# Patient Record
Sex: MALE | Race: BLACK OR AFRICAN AMERICAN | Employment: FULL TIME | URBAN - METROPOLITAN AREA
[De-identification: names, ages, dates, MRNs, and addresses within clinical notes are randomized per-mention and may not be internally consistent; named-entity substitution may affect disease eponyms.]

---

## 2017-12-14 ENCOUNTER — HOSPITAL ENCOUNTER (OUTPATIENT)
Dept: LAB | Age: 42
Discharge: HOME | End: 2017-12-14
Payer: OTHER GOVERNMENT

## 2017-12-14 DIAGNOSIS — I10 BENIGN HYPERTENSION: ICD-10-CM

## 2017-12-14 LAB
ALBUMIN SERPL-MCNC: 3.1 G/DL (ref 3.4–5)
ALBUMIN/GLOB SERPL: 0.8 {RATIO} (ref 0.8–1.7)
ALP SERPL-CCNC: 79 U/L (ref 45–117)
ALT SERPL-CCNC: 22 U/L (ref 16–61)
ANION GAP SERPL CALC-SCNC: 8 MMOL/L (ref 3–18)
AST SERPL-CCNC: 15 U/L (ref 15–37)
BILIRUB SERPL-MCNC: 0.3 MG/DL (ref 0.2–1)
BUN SERPL-MCNC: 22 MG/DL (ref 7–18)
BUN/CREAT SERPL: 16 (ref 12–20)
CALCIUM SERPL-MCNC: 8.8 MG/DL (ref 8.5–10.1)
CHLORIDE SERPL-SCNC: 104 MMOL/L (ref 100–108)
CO2 SERPL-SCNC: 27 MMOL/L (ref 21–32)
CREAT SERPL-MCNC: 1.34 MG/DL (ref 0.6–1.3)
GLOBULIN SER CALC-MCNC: 4.1 G/DL (ref 2–4)
GLUCOSE SERPL-MCNC: 106 MG/DL (ref 74–99)
POTASSIUM SERPL-SCNC: 4.4 MMOL/L (ref 3.5–5.5)
PROT SERPL-MCNC: 7.2 G/DL (ref 6.4–8.2)
SODIUM SERPL-SCNC: 139 MMOL/L (ref 136–145)

## 2017-12-14 PROCEDURE — 36415 COLL VENOUS BLD VENIPUNCTURE: CPT | Performed by: ORTHOPAEDIC SURGERY

## 2017-12-14 PROCEDURE — 93005 ELECTROCARDIOGRAM TRACING: CPT

## 2017-12-14 PROCEDURE — 80053 COMPREHEN METABOLIC PANEL: CPT | Performed by: ORTHOPAEDIC SURGERY

## 2017-12-14 RX ORDER — LISINOPRIL 2.5 MG/1
TABLET ORAL DAILY
Status: ON HOLD | COMMUNITY
End: 2017-12-20

## 2017-12-14 RX ORDER — FUROSEMIDE 40 MG/1
TABLET ORAL DAILY
Status: ON HOLD | COMMUNITY
End: 2017-12-29

## 2017-12-14 RX ORDER — CARVEDILOL 25 MG/1
25 TABLET ORAL 2 TIMES DAILY WITH MEALS
Status: ON HOLD | COMMUNITY
End: 2017-12-29

## 2017-12-15 LAB
ATRIAL RATE: 96 BPM
CALCULATED P AXIS, ECG09: 27 DEGREES
CALCULATED R AXIS, ECG10: -5 DEGREES
CALCULATED T AXIS, ECG11: -115 DEGREES
DIAGNOSIS, 93000: NORMAL
P-R INTERVAL, ECG05: 166 MS
Q-T INTERVAL, ECG07: 410 MS
QRS DURATION, ECG06: 100 MS
QTC CALCULATION (BEZET), ECG08: 517 MS
VENTRICULAR RATE, ECG03: 96 BPM

## 2017-12-19 ENCOUNTER — ANESTHESIA EVENT (OUTPATIENT)
Dept: SURGERY | Age: 42
DRG: 492 | End: 2017-12-19
Payer: OTHER GOVERNMENT

## 2017-12-20 ENCOUNTER — APPOINTMENT (OUTPATIENT)
Dept: GENERAL RADIOLOGY | Age: 42
DRG: 492 | End: 2017-12-20
Attending: PHYSICIAN ASSISTANT
Payer: OTHER GOVERNMENT

## 2017-12-20 ENCOUNTER — ANESTHESIA (OUTPATIENT)
Dept: SURGERY | Age: 42
DRG: 492 | End: 2017-12-20
Payer: OTHER GOVERNMENT

## 2017-12-20 ENCOUNTER — HOSPITAL ENCOUNTER (INPATIENT)
Age: 42
LOS: 9 days | Discharge: HOME | DRG: 492 | End: 2017-12-29
Attending: ORTHOPAEDIC SURGERY | Admitting: ORTHOPAEDIC SURGERY
Payer: OTHER GOVERNMENT

## 2017-12-20 ENCOUNTER — APPOINTMENT (OUTPATIENT)
Dept: GENERAL RADIOLOGY | Age: 42
DRG: 492 | End: 2017-12-20
Attending: ORTHOPAEDIC SURGERY
Payer: OTHER GOVERNMENT

## 2017-12-20 PROBLEM — M25.372 ANKLE INSTABILITY, LEFT: Status: ACTIVE | Noted: 2017-12-20

## 2017-12-20 PROBLEM — I97.89 BRADYCARDIA FOLLOWING SURGERY: Status: ACTIVE | Noted: 2017-12-20

## 2017-12-20 LAB
ABO + RH BLD: NORMAL
AMPHET UR QL SCN: NEGATIVE
ANION GAP SERPL CALC-SCNC: 4 MMOL/L (ref 3–18)
ARTERIAL PATENCY WRIST A: YES
ARTERIAL PATENCY WRIST A: YES
ATRIAL RATE: 98 BPM
BARBITURATES UR QL SCN: NEGATIVE
BASE DEFICIT BLD-SCNC: 1 MMOL/L
BASE EXCESS BLD CALC-SCNC: 0 MMOL/L
BASOPHILS # BLD: 0.1 K/UL (ref 0–0.06)
BASOPHILS NFR BLD: 1 % (ref 0–2)
BDY SITE: ABNORMAL
BDY SITE: ABNORMAL
BENZODIAZ UR QL: POSITIVE
BLOOD GROUP ANTIBODIES SERPL: NORMAL
BUN SERPL-MCNC: 20 MG/DL (ref 7–18)
BUN/CREAT SERPL: 13 (ref 12–20)
CA-I SERPL-SCNC: 1.15 MMOL/L (ref 1.12–1.32)
CALCIUM SERPL-MCNC: 8 MG/DL (ref 8.5–10.1)
CALCULATED P AXIS, ECG09: 27 DEGREES
CALCULATED R AXIS, ECG10: 24 DEGREES
CALCULATED T AXIS, ECG11: -40 DEGREES
CANNABINOIDS UR QL SCN: NEGATIVE
CHLORIDE SERPL-SCNC: 105 MMOL/L (ref 100–108)
CK MB CFR SERPL CALC: 0.7 % (ref 0–4)
CK MB SERPL-MCNC: 1.7 NG/ML (ref 5–25)
CK SERPL-CCNC: 239 U/L (ref 39–308)
CO2 SERPL-SCNC: 29 MMOL/L (ref 21–32)
COCAINE UR QL SCN: NEGATIVE
CREAT SERPL-MCNC: 1.53 MG/DL (ref 0.6–1.3)
DIAGNOSIS, 93000: NORMAL
DIFFERENTIAL METHOD BLD: ABNORMAL
EOSINOPHIL # BLD: 0.2 K/UL (ref 0–0.4)
EOSINOPHIL NFR BLD: 2 % (ref 0–5)
ERYTHROCYTE [DISTWIDTH] IN BLOOD BY AUTOMATED COUNT: 14.4 % (ref 11.6–14.5)
GAS FLOW.O2 O2 DELIVERY SYS: ABNORMAL L/MIN
GAS FLOW.O2 O2 DELIVERY SYS: ABNORMAL L/MIN
GAS FLOW.O2 SETTING OXYMISER: 12 BPM
GAS FLOW.O2 SETTING OXYMISER: 16 BPM
GLUCOSE BLD STRIP.AUTO-MCNC: 94 MG/DL (ref 70–110)
GLUCOSE SERPL-MCNC: 209 MG/DL (ref 74–99)
HCO3 BLD-SCNC: 26.5 MMOL/L (ref 22–26)
HCO3 BLD-SCNC: 26.8 MMOL/L (ref 22–26)
HCT VFR BLD AUTO: 44.9 % (ref 36–48)
HDSCOM,HDSCOM: ABNORMAL
HGB BLD-MCNC: 14.2 G/DL (ref 13–16)
INSPIRATION.DURATION SETTING TIME VENT: 0.9 SEC
INSPIRATION.DURATION SETTING TIME VENT: 0.9 SEC
LYMPHOCYTES # BLD: 2.1 K/UL (ref 0.9–3.6)
LYMPHOCYTES NFR BLD: 23 % (ref 21–52)
MAGNESIUM SERPL-MCNC: 2 MG/DL (ref 1.6–2.6)
MCH RBC QN AUTO: 27.5 PG (ref 24–34)
MCHC RBC AUTO-ENTMCNC: 31.6 G/DL (ref 31–37)
MCV RBC AUTO: 87 FL (ref 74–97)
METHADONE UR QL: NEGATIVE
MONOCYTES # BLD: 0.8 K/UL (ref 0.05–1.2)
MONOCYTES NFR BLD: 9 % (ref 3–10)
NEUTS SEG # BLD: 6 K/UL (ref 1.8–8)
NEUTS SEG NFR BLD: 65 % (ref 40–73)
O2/TOTAL GAS SETTING VFR VENT: 5 %
O2/TOTAL GAS SETTING VFR VENT: 50 %
OPIATES UR QL: NEGATIVE
P-R INTERVAL, ECG05: 160 MS
PCO2 BLD: 53.2 MMHG (ref 35–45)
PCO2 BLD: 64.9 MMHG (ref 35–45)
PCP UR QL: NEGATIVE
PEEP RESPIRATORY: 5 CMH2O
PEEP RESPIRATORY: 5 CMH2O
PH BLD: 7.22 [PH] (ref 7.35–7.45)
PH BLD: 7.31 [PH] (ref 7.35–7.45)
PLATELET # BLD AUTO: 276 K/UL (ref 135–420)
PMV BLD AUTO: 11 FL (ref 9.2–11.8)
PO2 BLD: 110 MMHG (ref 80–100)
PO2 BLD: 114 MMHG (ref 80–100)
POTASSIUM SERPL-SCNC: 5.6 MMOL/L (ref 3.5–5.5)
Q-T INTERVAL, ECG07: 432 MS
QRS DURATION, ECG06: 100 MS
QTC CALCULATION (BEZET), ECG08: 551 MS
RBC # BLD AUTO: 5.16 M/UL (ref 4.7–5.5)
SAO2 % BLD: 97 % (ref 92–97)
SAO2 % BLD: 98 % (ref 92–97)
SERVICE CMNT-IMP: ABNORMAL
SERVICE CMNT-IMP: ABNORMAL
SODIUM SERPL-SCNC: 138 MMOL/L (ref 136–145)
SPECIMEN EXP DATE BLD: NORMAL
SPECIMEN TYPE: ABNORMAL
SPECIMEN TYPE: ABNORMAL
TOTAL RESP. RATE, ITRR: 16
TOTAL RESP. RATE, ITRR: 18
TROPONIN I SERPL-MCNC: 0.07 NG/ML (ref 0–0.04)
VENTILATION MODE VENT: ABNORMAL
VENTILATION MODE VENT: ABNORMAL
VENTRICULAR RATE, ECG03: 98 BPM
VOLUME CONTROL PLUS IVLCP: YES
VOLUME CONTROL PLUS IVLCP: YES
VT SETTING VENT: 500 ML
VT SETTING VENT: 500 ML
WBC # BLD AUTO: 9.1 K/UL (ref 4.6–13.2)

## 2017-12-20 PROCEDURE — 74011250636 HC RX REV CODE- 250/636: Performed by: ANESTHESIOLOGY

## 2017-12-20 PROCEDURE — 77030003601 HC NDL NRV BLK BBMI -A: Performed by: ORTHOPAEDIC SURGERY

## 2017-12-20 PROCEDURE — 74011000258 HC RX REV CODE- 258

## 2017-12-20 PROCEDURE — 77030019896 HC KT ARTERIAL LN TELE -B

## 2017-12-20 PROCEDURE — 76942 ECHO GUIDE FOR BIOPSY: CPT | Performed by: ANESTHESIOLOGY

## 2017-12-20 PROCEDURE — 77030018836 HC SOL IRR NACL ICUM -A: Performed by: ORTHOPAEDIC SURGERY

## 2017-12-20 PROCEDURE — 76060000034 HC ANESTHESIA 1.5 TO 2 HR: Performed by: ORTHOPAEDIC SURGERY

## 2017-12-20 PROCEDURE — 65610000006 HC RM INTENSIVE CARE

## 2017-12-20 PROCEDURE — 77030005538 HC CATH URETH FOL44 BARD -B

## 2017-12-20 PROCEDURE — 36600 WITHDRAWAL OF ARTERIAL BLOOD: CPT

## 2017-12-20 PROCEDURE — 64450 NJX AA&/STRD OTHER PN/BRANCH: CPT | Performed by: ANESTHESIOLOGY

## 2017-12-20 PROCEDURE — 82550 ASSAY OF CK (CPK): CPT

## 2017-12-20 PROCEDURE — 86900 BLOOD TYPING SEROLOGIC ABO: CPT | Performed by: NURSE PRACTITIONER

## 2017-12-20 PROCEDURE — 74011000250 HC RX REV CODE- 250

## 2017-12-20 PROCEDURE — 77030013797 HC KT TRNSDUC PRSSR EDWD -A

## 2017-12-20 PROCEDURE — 77030002916 HC SUT ETHLN J&J -A: Performed by: ORTHOPAEDIC SURGERY

## 2017-12-20 PROCEDURE — 94002 VENT MGMT INPAT INIT DAY: CPT

## 2017-12-20 PROCEDURE — 74011250637 HC RX REV CODE- 250/637: Performed by: ANESTHESIOLOGY

## 2017-12-20 PROCEDURE — 82330 ASSAY OF CALCIUM: CPT

## 2017-12-20 PROCEDURE — 76010000153 HC OR TIME 1.5 TO 2 HR: Performed by: ORTHOPAEDIC SURGERY

## 2017-12-20 PROCEDURE — 02HV33Z INSERTION OF INFUSION DEVICE INTO SUPERIOR VENA CAVA, PERCUTANEOUS APPROACH: ICD-10-PCS | Performed by: INTERNAL MEDICINE

## 2017-12-20 PROCEDURE — 80307 DRUG TEST PRSMV CHEM ANLYZR: CPT

## 2017-12-20 PROCEDURE — 82803 BLOOD GASES ANY COMBINATION: CPT

## 2017-12-20 PROCEDURE — 77030031139 HC SUT VCRL2 J&J -A: Performed by: ORTHOPAEDIC SURGERY

## 2017-12-20 PROCEDURE — 77030008467 HC STPLR SKN COVD -B: Performed by: ORTHOPAEDIC SURGERY

## 2017-12-20 PROCEDURE — 74011250636 HC RX REV CODE- 250/636

## 2017-12-20 PROCEDURE — 74011250636 HC RX REV CODE- 250/636: Performed by: NURSE PRACTITIONER

## 2017-12-20 PROCEDURE — 74011250636 HC RX REV CODE- 250/636: Performed by: PHYSICIAN ASSISTANT

## 2017-12-20 PROCEDURE — 77030020782 HC GWN BAIR PAWS FLX 3M -B: Performed by: ORTHOPAEDIC SURGERY

## 2017-12-20 PROCEDURE — 0SJG0ZZ INSPECTION OF LEFT ANKLE JOINT, OPEN APPROACH: ICD-10-PCS | Performed by: ORTHOPAEDIC SURGERY

## 2017-12-20 PROCEDURE — 74011250636 HC RX REV CODE- 250/636: Performed by: INTERNAL MEDICINE

## 2017-12-20 PROCEDURE — 74011000258 HC RX REV CODE- 258: Performed by: PHYSICIAN ASSISTANT

## 2017-12-20 PROCEDURE — 5A12012 PERFORMANCE OF CARDIAC OUTPUT, SINGLE, MANUAL: ICD-10-PCS | Performed by: ANESTHESIOLOGY

## 2017-12-20 PROCEDURE — 82962 GLUCOSE BLOOD TEST: CPT

## 2017-12-20 PROCEDURE — 36592 COLLECT BLOOD FROM PICC: CPT

## 2017-12-20 PROCEDURE — 77030034850

## 2017-12-20 PROCEDURE — 85025 COMPLETE CBC W/AUTO DIFF WBC: CPT

## 2017-12-20 PROCEDURE — 74011250637 HC RX REV CODE- 250/637: Performed by: PHYSICIAN ASSISTANT

## 2017-12-20 PROCEDURE — 77030019952 HC CANSTR VAC ASST KCON -B

## 2017-12-20 PROCEDURE — 74011000250 HC RX REV CODE- 250: Performed by: PHYSICIAN ASSISTANT

## 2017-12-20 PROCEDURE — 80048 BASIC METABOLIC PNL TOTAL CA: CPT

## 2017-12-20 PROCEDURE — 77030003029 HC SUT VCRL J&J -B: Performed by: ORTHOPAEDIC SURGERY

## 2017-12-20 PROCEDURE — 83735 ASSAY OF MAGNESIUM: CPT

## 2017-12-20 PROCEDURE — 77030032490 HC SLV COMPR SCD KNE COVD -B: Performed by: ORTHOPAEDIC SURGERY

## 2017-12-20 PROCEDURE — 93005 ELECTROCARDIOGRAM TRACING: CPT

## 2017-12-20 PROCEDURE — 3E0T3BZ INTRODUCTION OF ANESTHETIC AGENT INTO PERIPHERAL NERVES AND PLEXI, PERCUTANEOUS APPROACH: ICD-10-PCS | Performed by: ANESTHESIOLOGY

## 2017-12-20 PROCEDURE — 71010 XR CHEST PORT: CPT

## 2017-12-20 PROCEDURE — C8929 TTE W OR WO FOL WCON,DOPPLER: HCPCS

## 2017-12-20 PROCEDURE — 77030019605: Performed by: ORTHOPAEDIC SURGERY

## 2017-12-20 RX ORDER — IPRATROPIUM BROMIDE AND ALBUTEROL SULFATE 2.5; .5 MG/3ML; MG/3ML
3 SOLUTION RESPIRATORY (INHALATION)
Status: DISCONTINUED | OUTPATIENT
Start: 2017-12-20 | End: 2017-12-29 | Stop reason: HOSPADM

## 2017-12-20 RX ORDER — FAMOTIDINE 10 MG/ML
20 INJECTION INTRAVENOUS EVERY 12 HOURS
Status: DISCONTINUED | OUTPATIENT
Start: 2017-12-20 | End: 2017-12-24

## 2017-12-20 RX ORDER — SODIUM CHLORIDE 0.9 % (FLUSH) 0.9 %
5-10 SYRINGE (ML) INJECTION AS NEEDED
Status: DISCONTINUED | OUTPATIENT
Start: 2017-12-20 | End: 2017-12-20 | Stop reason: HOSPADM

## 2017-12-20 RX ORDER — MIDAZOLAM IN 0.9 % SOD.CHLORID 1 MG/ML
0-10 PLASTIC BAG, INJECTION (ML) INTRAVENOUS
Status: DISCONTINUED | OUTPATIENT
Start: 2017-12-20 | End: 2017-12-23

## 2017-12-20 RX ORDER — ATROPINE SULFATE 0.4 MG/ML
INJECTION, SOLUTION ENDOTRACHEAL; INTRAMEDULLARY; INTRAMUSCULAR; INTRAVENOUS; SUBCUTANEOUS AS NEEDED
Status: DISCONTINUED | OUTPATIENT
Start: 2017-12-20 | End: 2017-12-20 | Stop reason: HOSPADM

## 2017-12-20 RX ORDER — DOBUTAMINE HYDROCHLORIDE 200 MG/100ML
2.5 INJECTION INTRAVENOUS CONTINUOUS
Status: DISCONTINUED | OUTPATIENT
Start: 2017-12-20 | End: 2017-12-22

## 2017-12-20 RX ORDER — SODIUM CHLORIDE, SODIUM LACTATE, POTASSIUM CHLORIDE, CALCIUM CHLORIDE 600; 310; 30; 20 MG/100ML; MG/100ML; MG/100ML; MG/100ML
75 INJECTION, SOLUTION INTRAVENOUS CONTINUOUS
Status: DISCONTINUED | OUTPATIENT
Start: 2017-12-20 | End: 2017-12-20

## 2017-12-20 RX ORDER — ROPIVACAINE HYDROCHLORIDE 5 MG/ML
30 INJECTION, SOLUTION EPIDURAL; INFILTRATION; PERINEURAL
Status: COMPLETED | OUTPATIENT
Start: 2017-12-20 | End: 2017-12-20

## 2017-12-20 RX ORDER — MIDAZOLAM HYDROCHLORIDE 1 MG/ML
INJECTION, SOLUTION INTRAMUSCULAR; INTRAVENOUS
Status: COMPLETED
Start: 2017-12-20 | End: 2017-12-20

## 2017-12-20 RX ORDER — FAMOTIDINE 20 MG/50ML
20 INJECTION, SOLUTION INTRAVENOUS EVERY 12 HOURS
Status: DISCONTINUED | OUTPATIENT
Start: 2017-12-20 | End: 2017-12-20 | Stop reason: RX

## 2017-12-20 RX ORDER — SODIUM CHLORIDE 9 MG/ML
100 INJECTION, SOLUTION INTRAVENOUS CONTINUOUS
Status: DISCONTINUED | OUTPATIENT
Start: 2017-12-20 | End: 2017-12-20

## 2017-12-20 RX ORDER — NOREPINEPHRINE BITARTRATE 1 MG/ML
INJECTION, SOLUTION INTRAVENOUS
Status: DISPENSED
Start: 2017-12-20 | End: 2017-12-21

## 2017-12-20 RX ORDER — PROPOFOL 10 MG/ML
INJECTION, EMULSION INTRAVENOUS
Status: COMPLETED
Start: 2017-12-20 | End: 2017-12-20

## 2017-12-20 RX ORDER — EPINEPHRINE 0.1 MG/ML
INJECTION INTRACARDIAC; INTRAVENOUS
Status: COMPLETED | OUTPATIENT
Start: 2017-12-20 | End: 2017-12-20

## 2017-12-20 RX ORDER — ROCURONIUM BROMIDE 10 MG/ML
INJECTION, SOLUTION INTRAVENOUS AS NEEDED
Status: DISCONTINUED | OUTPATIENT
Start: 2017-12-20 | End: 2017-12-20 | Stop reason: HOSPADM

## 2017-12-20 RX ORDER — CHLORHEXIDINE GLUCONATE 1.2 MG/ML
10 RINSE ORAL EVERY 12 HOURS
Status: DISCONTINUED | OUTPATIENT
Start: 2017-12-20 | End: 2017-12-23

## 2017-12-20 RX ORDER — IPRATROPIUM BROMIDE AND ALBUTEROL SULFATE 2.5; .5 MG/3ML; MG/3ML
3 SOLUTION RESPIRATORY (INHALATION)
Status: DISCONTINUED | OUTPATIENT
Start: 2017-12-20 | End: 2017-12-20

## 2017-12-20 RX ORDER — EPINEPHRINE 1 MG/ML
INJECTION, SOLUTION, CONCENTRATE INTRAVENOUS AS NEEDED
Status: DISCONTINUED | OUTPATIENT
Start: 2017-12-20 | End: 2017-12-20 | Stop reason: HOSPADM

## 2017-12-20 RX ORDER — MIDAZOLAM HYDROCHLORIDE 1 MG/ML
2 INJECTION, SOLUTION INTRAMUSCULAR; INTRAVENOUS ONCE
Status: COMPLETED | OUTPATIENT
Start: 2017-12-20 | End: 2017-12-20

## 2017-12-20 RX ORDER — FAMOTIDINE 20 MG/1
20 TABLET, FILM COATED ORAL ONCE
Status: COMPLETED | OUTPATIENT
Start: 2017-12-20 | End: 2017-12-20

## 2017-12-20 RX ORDER — GLYCOPYRROLATE 0.2 MG/ML
INJECTION INTRAMUSCULAR; INTRAVENOUS AS NEEDED
Status: DISCONTINUED | OUTPATIENT
Start: 2017-12-20 | End: 2017-12-20 | Stop reason: HOSPADM

## 2017-12-20 RX ORDER — PROPOFOL 10 MG/ML
0-50 VIAL (ML) INTRAVENOUS
Status: DISCONTINUED | OUTPATIENT
Start: 2017-12-20 | End: 2017-12-23

## 2017-12-20 RX ORDER — FENTANYL CITRATE 50 UG/ML
100 INJECTION, SOLUTION INTRAMUSCULAR; INTRAVENOUS ONCE
Status: COMPLETED | OUTPATIENT
Start: 2017-12-20 | End: 2017-12-20

## 2017-12-20 RX ORDER — CARVEDILOL 12.5 MG/1
12.5 TABLET ORAL 2 TIMES DAILY WITH MEALS
COMMUNITY
End: 2017-12-29

## 2017-12-20 RX ORDER — LIDOCAINE HYDROCHLORIDE 20 MG/ML
INJECTION, SOLUTION EPIDURAL; INFILTRATION; INTRACAUDAL; PERINEURAL AS NEEDED
Status: DISCONTINUED | OUTPATIENT
Start: 2017-12-20 | End: 2017-12-20 | Stop reason: HOSPADM

## 2017-12-20 RX ORDER — PROPOFOL 10 MG/ML
INJECTION, EMULSION INTRAVENOUS AS NEEDED
Status: DISCONTINUED | OUTPATIENT
Start: 2017-12-20 | End: 2017-12-20 | Stop reason: HOSPADM

## 2017-12-20 RX ORDER — SODIUM CHLORIDE 0.9 % (FLUSH) 0.9 %
5-10 SYRINGE (ML) INJECTION EVERY 8 HOURS
Status: DISCONTINUED | OUTPATIENT
Start: 2017-12-20 | End: 2017-12-20 | Stop reason: HOSPADM

## 2017-12-20 RX ORDER — LISINOPRIL 10 MG/1
10 TABLET ORAL 3 TIMES DAILY
Status: ON HOLD | COMMUNITY
End: 2017-12-29

## 2017-12-20 RX ORDER — PHENYLEPHRINE HYDROCHLORIDE 10 MG/ML
INJECTION INTRAVENOUS
Status: DISPENSED
Start: 2017-12-20 | End: 2017-12-20

## 2017-12-20 RX ORDER — MIDAZOLAM HYDROCHLORIDE 1 MG/ML
2 INJECTION, SOLUTION INTRAMUSCULAR; INTRAVENOUS
Status: DISCONTINUED | OUTPATIENT
Start: 2017-12-20 | End: 2017-12-23

## 2017-12-20 RX ORDER — LABETALOL HYDROCHLORIDE 5 MG/ML
5 INJECTION, SOLUTION INTRAVENOUS AS NEEDED
Status: DISCONTINUED | OUTPATIENT
Start: 2017-12-20 | End: 2017-12-20

## 2017-12-20 RX ORDER — GUAIFENESIN 100 MG/5ML
81 LIQUID (ML) ORAL DAILY
Status: ON HOLD | COMMUNITY
End: 2017-12-29

## 2017-12-20 RX ORDER — FENTANYL CITRATE 50 UG/ML
INJECTION, SOLUTION INTRAMUSCULAR; INTRAVENOUS
Status: DISPENSED
Start: 2017-12-20 | End: 2017-12-20

## 2017-12-20 RX ORDER — MIDAZOLAM HYDROCHLORIDE 1 MG/ML
INJECTION, SOLUTION INTRAMUSCULAR; INTRAVENOUS AS NEEDED
Status: DISCONTINUED | OUTPATIENT
Start: 2017-12-20 | End: 2017-12-20 | Stop reason: HOSPADM

## 2017-12-20 RX ORDER — SUCCINYLCHOLINE CHLORIDE 20 MG/ML
INJECTION INTRAMUSCULAR; INTRAVENOUS AS NEEDED
Status: DISCONTINUED | OUTPATIENT
Start: 2017-12-20 | End: 2017-12-20 | Stop reason: HOSPADM

## 2017-12-20 RX ORDER — FENTANYL CITRATE 50 UG/ML
INJECTION, SOLUTION INTRAMUSCULAR; INTRAVENOUS AS NEEDED
Status: DISCONTINUED | OUTPATIENT
Start: 2017-12-20 | End: 2017-12-20 | Stop reason: HOSPADM

## 2017-12-20 RX ORDER — ENOXAPARIN SODIUM 100 MG/ML
40 INJECTION SUBCUTANEOUS EVERY 24 HOURS
Status: DISCONTINUED | OUTPATIENT
Start: 2017-12-20 | End: 2017-12-26

## 2017-12-20 RX ADMIN — SUCCINYLCHOLINE CHLORIDE 140 MG: 20 INJECTION INTRAMUSCULAR; INTRAVENOUS at 07:44

## 2017-12-20 RX ADMIN — SODIUM CHLORIDE, SODIUM LACTATE, POTASSIUM CHLORIDE, AND CALCIUM CHLORIDE 75 ML/HR: 600; 310; 30; 20 INJECTION, SOLUTION INTRAVENOUS at 12:23

## 2017-12-20 RX ADMIN — DEXTROSE MONOHYDRATE 6 MCG/MIN: 5 INJECTION, SOLUTION INTRAVENOUS at 19:48

## 2017-12-20 RX ADMIN — DOBUTAMINE IN DEXTROSE 2.5 MCG/KG/MIN: 200 INJECTION, SOLUTION INTRAVENOUS at 20:47

## 2017-12-20 RX ADMIN — EPINEPHRINE 1 MG: 0.1 INJECTION, SOLUTION ENDOTRACHEAL; INTRACARDIAC; INTRAVENOUS at 08:39

## 2017-12-20 RX ADMIN — GLYCOPYRROLATE 0.2 MG: 0.2 INJECTION INTRAMUSCULAR; INTRAVENOUS at 08:32

## 2017-12-20 RX ADMIN — ROPIVACAINE HYDROCHLORIDE 150 MG: 5 INJECTION, SOLUTION EPIDURAL; INFILTRATION; PERINEURAL at 07:11

## 2017-12-20 RX ADMIN — EPINEPHRINE 1 MG: 1 INJECTION, SOLUTION, CONCENTRATE INTRAVENOUS at 08:43

## 2017-12-20 RX ADMIN — DEXTROSE MONOHYDRATE 10 MCG/MIN: 5 INJECTION, SOLUTION INTRAVENOUS at 12:00

## 2017-12-20 RX ADMIN — MIDAZOLAM HYDROCHLORIDE 4 MG: 1 INJECTION, SOLUTION INTRAMUSCULAR; INTRAVENOUS at 10:36

## 2017-12-20 RX ADMIN — ROCURONIUM BROMIDE 5 MG: 10 INJECTION, SOLUTION INTRAVENOUS at 07:44

## 2017-12-20 RX ADMIN — Medication 10 ML: at 06:15

## 2017-12-20 RX ADMIN — MIDAZOLAM HYDROCHLORIDE 2 MG: 1 INJECTION, SOLUTION INTRAMUSCULAR; INTRAVENOUS at 07:10

## 2017-12-20 RX ADMIN — EPINEPHRINE 1 MG: 1 INJECTION, SOLUTION, CONCENTRATE INTRAVENOUS at 08:39

## 2017-12-20 RX ADMIN — PHENYLEPHRINE HYDROCHLORIDE 300 MCG/MIN: 10 INJECTION, SOLUTION INTRAMUSCULAR; INTRAVENOUS; SUBCUTANEOUS at 10:30

## 2017-12-20 RX ADMIN — ROCURONIUM BROMIDE 45 MG: 10 INJECTION, SOLUTION INTRAVENOUS at 07:49

## 2017-12-20 RX ADMIN — FAMOTIDINE 20 MG: 10 INJECTION, SOLUTION INTRAVENOUS at 15:48

## 2017-12-20 RX ADMIN — EPINEPHRINE 1 MG: 1 INJECTION, SOLUTION, CONCENTRATE INTRAVENOUS at 08:41

## 2017-12-20 RX ADMIN — FAMOTIDINE 20 MG: 20 TABLET, FILM COATED ORAL at 07:00

## 2017-12-20 RX ADMIN — EPINEPHRINE 1 MG: 0.1 INJECTION, SOLUTION ENDOTRACHEAL; INTRACARDIAC; INTRAVENOUS at 08:41

## 2017-12-20 RX ADMIN — CHLORHEXIDINE GLUCONATE 10 ML: 1.2 RINSE ORAL at 20:53

## 2017-12-20 RX ADMIN — SODIUM CHLORIDE, SODIUM LACTATE, POTASSIUM CHLORIDE, AND CALCIUM CHLORIDE 75 ML/HR: 600; 310; 30; 20 INJECTION, SOLUTION INTRAVENOUS at 10:43

## 2017-12-20 RX ADMIN — PROPOFOL 30 MCG/KG/MIN: 10 INJECTION, EMULSION INTRAVENOUS at 19:09

## 2017-12-20 RX ADMIN — GLYCOPYRROLATE 0.2 MG: 0.2 INJECTION INTRAMUSCULAR; INTRAVENOUS at 07:39

## 2017-12-20 RX ADMIN — PERFLUTREN 2 ML: 6.52 INJECTION, SUSPENSION INTRAVENOUS at 15:25

## 2017-12-20 RX ADMIN — PROPOFOL 30 MCG/KG/MIN: 10 INJECTION, EMULSION INTRAVENOUS at 15:52

## 2017-12-20 RX ADMIN — ROCURONIUM BROMIDE 50 MG: 10 INJECTION, SOLUTION INTRAVENOUS at 09:18

## 2017-12-20 RX ADMIN — Medication 50 MCG/HR: at 11:27

## 2017-12-20 RX ADMIN — EPINEPHRINE 5 MCG/MIN: 1 INJECTION INTRAMUSCULAR; INTRAVENOUS; SUBCUTANEOUS at 10:41

## 2017-12-20 RX ADMIN — SODIUM CHLORIDE, SODIUM LACTATE, POTASSIUM CHLORIDE, AND CALCIUM CHLORIDE 75 ML/HR: 600; 310; 30; 20 INJECTION, SOLUTION INTRAVENOUS at 06:15

## 2017-12-20 RX ADMIN — MIDAZOLAM HYDROCHLORIDE 4 MG: 1 INJECTION, SOLUTION INTRAMUSCULAR; INTRAVENOUS at 09:51

## 2017-12-20 RX ADMIN — Medication 180 MCG/HR: at 22:23

## 2017-12-20 RX ADMIN — ATROPINE SULFATE 1 MG: 0.4 INJECTION, SOLUTION ENDOTRACHEAL; INTRAMEDULLARY; INTRAMUSCULAR; INTRAVENOUS; SUBCUTANEOUS at 08:34

## 2017-12-20 RX ADMIN — CHLORHEXIDINE GLUCONATE 10 ML: 1.2 RINSE ORAL at 15:47

## 2017-12-20 RX ADMIN — ENOXAPARIN SODIUM 40 MG: 40 INJECTION SUBCUTANEOUS at 15:46

## 2017-12-20 RX ADMIN — Medication 2 MG/HR: at 10:38

## 2017-12-20 RX ADMIN — EPINEPHRINE 1 MG: 0.1 INJECTION, SOLUTION ENDOTRACHEAL; INTRACARDIAC; INTRAVENOUS at 08:43

## 2017-12-20 RX ADMIN — FENTANYL CITRATE 100 MCG: 50 INJECTION INTRAMUSCULAR; INTRAVENOUS at 07:09

## 2017-12-20 RX ADMIN — FAMOTIDINE 20 MG: 10 INJECTION, SOLUTION INTRAVENOUS at 20:57

## 2017-12-20 RX ADMIN — FENTANYL CITRATE 100 MCG: 50 INJECTION, SOLUTION INTRAMUSCULAR; INTRAVENOUS at 07:44

## 2017-12-20 RX ADMIN — MIDAZOLAM HYDROCHLORIDE 2 MG: 1 INJECTION, SOLUTION INTRAMUSCULAR; INTRAVENOUS at 07:39

## 2017-12-20 RX ADMIN — Medication 6 MG/HR: at 22:24

## 2017-12-20 RX ADMIN — ATROPINE SULFATE 1 MG: 0.4 INJECTION, SOLUTION ENDOTRACHEAL; INTRAMEDULLARY; INTRAMUSCULAR; INTRAVENOUS; SUBCUTANEOUS at 08:36

## 2017-12-20 RX ADMIN — LIDOCAINE HYDROCHLORIDE 40 MG: 20 INJECTION, SOLUTION EPIDURAL; INFILTRATION; INTRACAUDAL; PERINEURAL at 07:44

## 2017-12-20 RX ADMIN — PROPOFOL 170 MG: 10 INJECTION, EMULSION INTRAVENOUS at 07:44

## 2017-12-20 NOTE — ANESTHESIA PROCEDURE NOTES
Arterial Line Placement    Start time: 12/20/2017 8:50 AM  End time: 12/20/2017 8:55 AM  Performed by: Sandip Jean  Authorized by: Maximiliano Sanchez     Pre-Procedure  Indications:  Arterial pressure monitoring, blood sampling and other (comment)  Preanesthetic Checklist: patient identified, anesthesia consent, site marked, patient being monitored, timeout performed and patient being monitored    Timeout Time: 08:51        Procedure:   Prep:  Chlorhexidine  Seldinger Technique?: Yes    Orientation:  Left  Location:  Radial artery  Catheter size:  20 G  Number of attempts:  1  Cont Cardiac Output Sensor: No      Assessment:   Post-procedure:  Line secured and sterile dressing applied  Patient Tolerance:  Patient tolerated the procedure well with no immediate complications  Comment:   Called into room for code assistance. CPR no longer in progress    Patient with positive left radial palpable pulse  A line place with out difficulty .

## 2017-12-20 NOTE — PROCEDURES
Ebony Jacob Pulmonary Specialist    [facility] Procedure Note With US Guidance    Indication: Need for vasopressors    Risks, benefits, alternatives explained and consent obtained. Patient positioned in Trendelenburg. Central line Bundle:  Full sterile barrier precautions used. 7-Step Sterility Protocol followed. (cap, mask sterile gown, sterile gloves, large sterile sheet, hand hygiene, 2% chlorhexidine for cutaneous antisepsis)  5 mL 1% Lidocaine placed at insertion site. Using ultrasound guidance,   Left internal jugular cannulated x 1 attempt(s) utilizing the modified Seldinger technique. nodifficulty. Position of wire confirmed in vein using US before dilating. Wire was removed. Good blood return. Catheter secured & Biopatch applied. Sterile Tegaderm placed.         CXR with good line placement, no PTX      [facility]   12/20/2017 11:51 AM

## 2017-12-20 NOTE — PROGRESS NOTES
Provided prayer and spiritual support for the family.     [facility] Certified [address] [facility]   [phone]

## 2017-12-20 NOTE — ADDENDUM NOTE
Addendum  created 12/20/17 1256 by Jesusita Claudio CRNA    Anesthesia Intra Meds edited, Orders acknowledged in Narrator

## 2017-12-20 NOTE — IP AVS SNAPSHOT
Klaudia Barrett 
 
 
 [address] [facility] [address] 
[phone] Patient: Carlota Muller MRN: [id number] :1975 My Medications TAKE these medications as instructed Instructions Each Dose to Equal  
 Morning Noon Evening Bedtime  
 aspirin 81 mg chewable tablet Your last dose was: Your next dose is: Take 1 Tab by mouth daily. 81 mg  
    
   
   
   
  
 carvedilol 25 mg tablet Commonly known as:  Macel Jose Angel Your last dose was: Your next dose is: Take 1 Tab by mouth two (2) times daily (with meals). 25 mg  
    
   
   
   
  
 furosemide 40 mg tablet Commonly known as:  LASIX Your last dose was: Your next dose is: Take 1 Tab by mouth daily. 40 mg  
    
   
   
   
  
 lisinopril 20 mg tablet Commonly known as:  Ayaka Needy Your last dose was: Your next dose is: Take 1 Tab by mouth daily. 20 mg  
    
   
   
   
  
 spironolactone 25 mg tablet Commonly known as:  ALDACTONE Start taking on:  2017 Your last dose was: Your next dose is: Take 1 Tab by mouth daily. 25 mg Where to Get Your Medications Information on where to get these meds will be given to you by the nurse or doctor. ! Ask your nurse or doctor about these medications  
  aspirin 81 mg chewable tablet  
 carvedilol 25 mg tablet  
 furosemide 40 mg tablet  
 lisinopril 20 mg tablet  
 spironolactone 25 mg tablet

## 2017-12-20 NOTE — CONSULTS
[address] [facility]    Analia Washington  MR#: [id number]  : 1975  ACCOUNT #: [de-identified]   DATE OF SERVICE: 2017    INDICATION:  Status post cardiac arrest during surgery. HISTORY:  This is a 60-year-old morbidly obese  male who was brought electively for foot surgery, had a TAD block along with sedation for intubation. About 15 minutes into the procedure, when the patient was on his right side, bradycardia and PEA arrest was noted. High quality CPR and ACLS medicines were given. He had return of circulation in 5 minutes, approximately, with 3 doses of epinephrine. He did not have any hypoxia or drop in end tidal CO2 as per pulmonary note. He was transferred to MICU. Surgery was abandoned. Central line was placed for medications and supportive treatment started. Stat echo was ordered. Preliminary it shows reduced ejection fraction in the range of 20%. Reviewing old records from [facility], the patient has had low ejection fraction since  at least at 20%. He has a history of hypertension. A stress test done in  did not reveal any ischemia or infarction. The patient never had a heart catheterization done. PAST HISTORY:  Positive for hypertension. Negative for diabetes. No previous MI in the past as per family, which includes mother and fiancee. Cat Santiago has been living with him for 8 years or so. There is a history of edema in the left ankle only after he got injury, for which the surgery was being done. He has dyspnea on exertion going up 1 flight of steps. He takes his blood pressure medications regularly as per yony along with a diuretic daily. She will bring the medications tomorrow. REVIEW OF SYSTEMS:  Negative for stroke, seizures, chronic skin disease, headaches. No recent fever, cough, cold, vomiting, diarrhea, hematuria, dysuria. No chronic lung, liver, or kidney disease.     PERSONAL HISTORY:  No smoking or drug abuse, but drinks alcohol about twice a week and each time the patient takes about 5 alcoholic drinks. FAMILY HISTORY:  Mother has rheumatic fever and has had apparently stents in the heart for blockages, but she does not remember her own details. She was present in the room. Apparently, the procedures were done in her late 35s. PHYSICAL EXAMINATION:  GENERAL:  Patient is intubated, unresponsive, sedated. VITAL SIGNS:  Heart rate is in 80s, sinus rhythm, blood pressure 112/63 on 6 mcg of Levophed. He is morbidly obese. NECK:  JVD is difficult to see due to obesity and intubation. No bruits in the neck. LUNGS:  Clear on ventilator without any rales or rhonchi. HEENT:  Normocephalic, atraumatic. CARDIAC:  S1, S2 regular. Did not appreciate any murmur, rub or gallop. ABDOMEN:  Obese, nontender, no masses felt. EXTREMITIES:  No edema in the right ankle. Foot is warm. Distal pulses were not palpable. LABORATORY DATA:  X-ray chest with pulmonary edema. Chemistry with K 5.6, BUN 20, creatinine 1.5, increased from 1.3 a week ago. Magnesium 2.0. Liver enzymes are normal.  Albumin is 3.1. First set of troponin I was 0.07, negative MBs on index. Hematology with normal white count, hemoglobin and platelets. Toxicology done today positive for benzodiazepines. ABG is on ventilator, in the chart per pulmonary. Twelve-lead EKG:  Sinus rhythm, nonspecific T-wave changes. IMPRESSION:    1. Cardiac arrest with PEA, which was short, witnessed, and no evidence of hypoxia. Return of spontaneous circulation in 5 minutes or less after 3 doses of epinephrine. Likely secondary to underlying severe cardiomyopathy, which is likely nonischemic in this patient as he had a normal stress test in 2013. Supportive care should be given. PE should be ruled out also, but right now he is on pressors and it will be difficult to rule it out. 2.  Cardiomyopathy, likely nonischemic.   The patient had normal stress test in 08/2013, has never had a cardiac cath. 3.  Congestive heart failure, chronic, systolic. He seems to be in NYHA class 2. He has dyspnea on exertion on 1 flight of steps. Clinically, it appears that it is compensated, but we will follow him along, give him supportive treatment, start his cardiomyopathy medications slowly as blood pressure improves and pressors are taken off. Eventually, he may need to be evaluated by advanced heart failure team for LVAD or possible heart transplant in case his brain function returns to normal.  4.  Morbid obesity. Losing weight will be beneficial to reduce his future cardiac problems, especially congestive heart failure. Family understands it. 5.  Plan as above. We will check on the echo that has been done and will follow up with you.       MD KIRT De Leon / Kevin Lewis  D: 12/20/2017 17:55     T: 12/20/2017 18:45  JOB #: [id number]

## 2017-12-20 NOTE — PROGRESS NOTES
conducted a pre-surgery visit with Lalo Mc, who is a 40 y.o.,male. The  provided the following Interventions:  Initiated a relationship of care and support. Plan:  Chaplains will continue to follow and will provide pastoral care on an as needed/requested basis.  recommends bedside caregivers page  on duty if patient shows signs of acute spiritual or emotional distress.     [address] [facility] Certified [address] [place] [address]   [phone]

## 2017-12-20 NOTE — ANESTHESIA POSTPROCEDURE EVALUATION
Post-Anesthesia Evaluation and Assessment    Patient: Alan Keenan MRN: [id number]  SSN: [id number]    YOB: 1975  Age: 40 y.o. Sex: male       Cardiovascular Function/Vital Signs  Visit Vitals    BP 92/58    Pulse 98    Temp 36 °C (96.8 °F)    Resp 16    Ht 5' 9\" (1.753 m)    Wt 153.3 kg (338 lb)    SpO2 100%    BMI 49.91 kg/m2       Patient is status post general anesthesia for Procedure(s):  LEFT ANKLE RECONSTRUCTION SECONDARY DISRUPTED COLLATERAL ANKLE LEGAMENT. Nausea/Vomiting: None    Postoperative hydration: reviewed and adequate    Pain: unable to assess    Neurological Status: sedation intubated     Neuro assessment - pt intubated and sedated    Mental Status and Level of Consciousness:     Pulmonary Status:   Pt intubated , sedated and on ventilator   Adequate oxygenation and airway patent    Complications related to anesthesia: Pt had arrhythmias and subsequent cardiac arrest responsive to immediate treatment. Post-anesthesia assessment completed. Any cardiac concerns now being followed by critical care.     Signed By: Esperanza Shah CRNA     December 20, 2017

## 2017-12-20 NOTE — IP AVS SNAPSHOT
Santi Shed 
 
 
 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  
@9:30am, wound check Phone:  [phone] Where:  [address] [address]  WOUND CHECK with Pacer Hv Csi at  9:30 AM  
Where:  [facility] ([address]) Follow up with Billy Ortega MD  
@2:15 Phone:  [phone] Where:  [facility], [address] [address],[address], [facility] 55  Follow up with Diego Goodman MD  
@9:30am  
  
Phone:  [phone] Where:  [place] [address] [address]  ESTABLISHED PATIENT with Vj Mcnulty MD at 12:45 PM  
Where:  [facility]) Discharge Orders None A check shanika indicates which time of day the medication should be taken. My Medications TAKE these medications as instructed Instructions Each Dose to Equal  
 Morning Noon Evening Bedtime  
 aspirin 81 mg chewable tablet Your last dose was: Your next dose is: Take 1 Tab by mouth daily. 81 mg  
    
   
   
   
  
 carvedilol 25 mg tablet Commonly known as:  Macel Jose Angel Your last dose was: Your next dose is: Take 1 Tab by mouth two (2) times daily (with meals). 25 mg  
    
   
   
   
  
 furosemide 40 mg tablet Commonly known as:  LASIX Your last dose was: Your next dose is: Take 1 Tab by mouth daily. 40 mg  
    
   
   
   
  
 lisinopril 20 mg tablet Commonly known as:  Ayaka Needy Your last dose was: Your next dose is: Take 1 Tab by mouth daily. 20 mg  
    
   
   
   
  
 spironolactone 25 mg tablet Commonly known as:  ALDACTONE Start taking on:  12/30/2017 Your last dose was: Your next dose is: Take 1 Tab by mouth daily. 25 mg Where to Get Your Medications Information on where to get these meds will be given to you by the nurse or doctor. ! Ask your nurse or doctor about these medications  
  aspirin 81 mg chewable tablet  
 carvedilol 25 mg tablet  
 furosemide 40 mg tablet  
 lisinopril 20 mg tablet  
 spironolactone 25 mg tablet Discharge Instructions INSTRUCTIONS FROM DR. Juanita Corley: 
Disposition: 
Will need follow-up with device/wound check in 7-10 days in my office. Please contact office at [phone] to confirm appointment. Main Office:   
[address] [facility], [address] [facility] 27 Restrictions: For affected arm:  No lifting greater than 10 lbs or lifting elbow above shoulder for 4 weeks. Keep incision clean and dry for a total of 72 hours after procedure. Remove dressing in 24 hours if not already removed. Please remove the steristrips (small white adhesive strips over wound) after 7 days if they have not already fallen off. No hot tubs or pools for 2 weeks. OK to shower with \"pat\" dry incision after 72 hours. No driving ideally for 2 weeks due to concern for airbag. DISCHARGE SUMMARY from Nurse PATIENT INSTRUCTIONS: 
 
 
Report the following to your surgeon: 
· Excessive pain, swelling, redness or odor of or around the surgical area · Temperature over 100.5 · Nausea and vomiting lasting longer than 4 hours or if unable to take medications · Any signs of decreased circulation or nerve impairment to extremity: change in color, persistent  numbness, tingling, coldness or increase pain · Any questions What to do at Home: 
Recommended activity: No heavy lifting, pushing, pulling with the implant side for 4 weeks *  Please give a list of your current medications to your Primary Care Provider. *  Please update this list whenever your medications are discontinued, doses are 
    changed, or new medications (including over-the-counter products) are added. *  Please carry medication information at all times in case of emergency situations. These are general instructions for a healthy lifestyle: No smoking/ No tobacco products/ Avoid exposure to second hand smoke Surgeon General's Warning:  Quitting smoking now greatly reduces serious risk to your health. Obesity, smoking, and sedentary lifestyle greatly increases your risk for illness A healthy diet, regular physical exercise & weight monitoring are important for maintaining a healthy lifestyle You may be retaining fluid if you have a history of heart failure or if you experience any of the following symptoms:  Weight gain of 3 pounds or more overnight or 5 pounds in a week, increased swelling in our hands or feet or shortness of breath while lying flat in bed. Please call your doctor as soon as you notice any of these symptoms; do not wait until your next office visit. Recognize signs and symptoms of STROKE: 
 
F-face looks uneven A-arms unable to move or move unevenly S-speech slurred or non-existent T-time-call 911 as soon as signs and symptoms begin-DO NOT go Back to bed or wait to see if you get better-TIME IS BRAIN. Warning Signs of HEART ATTACK Call 911 if you have these symptoms: 
? Chest discomfort. Most heart attacks involve discomfort in the center of the chest that lasts more than a few minutes, or that goes away and comes back. It can feel like uncomfortable pressure, squeezing, fullness, or pain. ? Discomfort in other areas of the upper body. Symptoms can include pain or discomfort in one or both arms, the back, neck, jaw, or stomach. ? Shortness of breath with or without chest discomfort. ? Other signs may include breaking out in a cold sweat, nausea, or lightheadedness. Don't wait more than five minutes to call [address]! Fast action can save your life. Calling 911 is almost always the fastest way to get lifesaving treatment. Emergency Medical Services staff can begin treatment when they arrive  up to an hour sooner than if someone gets to the hospital by car. The discharge information has been reviewed with the patient. The patient verbalized understanding. Discharge medications reviewed with the patient and appropriate educational materials and side effects teaching were provided. ___________________________________________________________________________________________________________________________________ Introducing \[facility]\"" & HEALTH SERVICES! Gelacio Viramontes introduces [employer] patient portal. Now you can access parts of your medical record, email your doctor's office, and request medication refills online. 1. In your internet browser, go to [email or web address]://Related Content Database ([email or web address]). [email or web address] information technologyt 2. Click on the First Time User? Click Here link in the Sign In box. You will see the New Member Sign Up page. 3. Enter your [employer] Access Code exactly as it appears below. You will not need to use this code after youve completed the sign-up process.  If you do not sign up before the expiration date, you must request a new code. · 1Ring Access Code: [email or web address] Expires: 3/29/2018 10:04 AM 
 
4. Enter the last four digits of your Social Security Number (xxxx) and Date of Birth (mm/dd/yyyy) as indicated and click Submit. You will be taken to the next sign-up page. 5. Create a 1Ring ID. This will be your 1Ring login ID and cannot be changed, so think of one that is secure and easy to remember. 6. Create a 1Ring password. You can change your password at any time. 7. Enter your Password Reset Question and Answer. This can be used at a later time if you forget your password. 8. Enter your e-mail address. You will receive e-mail notification when new information is available in [address] 9. Click Sign Up. You can now view and download portions of your medical record. 10. Click the Download Summary menu link to download a portable copy of your medical information. If you have questions, please visit the Frequently Asked Questions section of the 1Ring website. Remember, 1Ring is NOT to be used for urgent needs. For medical emergencies, dial 911. Now available from your iPhone and Android! Providers Seen During Your Hospitalization Provider Specialty Primary office phone Dat Vuong MD Orthopedic Surgery [phone] Immunizations Administered for This Admission Name Date Influenza Vaccine (Quad) PF  Deferred () Your Primary Care Physician (PCP) Primary Care Physician Office Phone Office Fax NONE ** None ** ** None ** You are allergic to the following No active allergies Recent Documentation Height Weight BMI Smoking Status 1.753 m 149.1 kg 48.56 kg/m2 Never Smoker Emergency Contacts Name Discharge Info Relation Home Work Mobile [address] CAREGIVER [3] Girlfriend [18] [phone] Patient Belongings The following personal items are in your possession at time of discharge: 
  Dental Appliances: None  Visual Aid: None      Home Medications: None   Jewelry: None  Clothing: Jacket/Coat, Socks, Footwear, Shirt, Pants, Undergarments    Other Valuables: Cell Phone, BBE Discharge Instructions Attachments/References ICD (IMPLANTABLE CARDIOVERTER-DEFIBRILLATOR): POST-OP (ENGLISH) HEART FAILURE: SELF-CARE: GENERAL INFO (ENGLISH) HEART FAILURE: LIMITING SODIUM AND FLUIDS (ENGLISH) CARVEDILOL (BY MOUTH) (ENGLISH) SPIRONOLACTONE (BY MOUTH) (ENGLISH) FUROSEMIDE (BY MOUTH) (ENGLISH) LISINOPRIL (BY MOUTH) (ENGLISH) ASPIRIN (BY MOUTH) (ENGLISH) Patient Handouts Implantable Cardioverter-Defibrillator Placement: What to Expect at Home Your Recovery Implantable cardioverter-defibrillator (ICD) placement is surgery to put an ICD in your chest. An ICD is a small, battery-powered device that fixes life-threatening changes in your heartbeat. If the ICD detects a life-threatening rapid heart rhythm, it tries to slow the rhythm to get it back to normal. If the dangerous rhythm does not stop, the ICD sends an electric shock to the heart to restore a normal rhythm. The device then goes back to its watchful mode. Your chest may be sore where the doctor made the cut (incision) and put in the ICD. You also may have a bruise and mild swelling. These symptoms usually get better in 1 to 2 weeks. You may feel a hard ridge along the incision. This usually gets softer in the months after surgery. You probably will be able to see and feel the outline of the ICD under your skin. You will probably be able to go back to work or your usual routine 1 to 2 weeks after surgery. Your doctor will talk to you about how often you will need to have the ICD checked. When you have an ICD, it is important to avoid electrical devices that can stop your ICD from working right.  Check with your doctor about what you need to stay away from, what you need to use with care, and what is okay to use. You will need to stay away from things with strong magnetic and electrical fields such as MRI machines (unless your ICD is safe for an MRI), welding equipment, and power generators. You can use a cell phone, but keep it at least 6 inches away from your ICD. You can safely use most household and office electronics. These include kitchen appliances, electric power tools, and computers. This care sheet gives you a general idea about how long it will take for you to recover. But each person recovers at a different pace. Follow the steps below to get better as quickly as possible. How can you care for yourself at home? Activity ? · Rest when you feel tired. Getting enough sleep will help you recover. ? · Try to walk each day. Start by walking a little more than you did the day before. Bit by bit, increase the amount you walk. Walking boosts blood flow and helps prevent pneumonia and constipation. ? · For 4 to 6 weeks: ¨ Avoid activities that strain your chest or upper arm muscles. This includes pushing a  or vacuum, mopping floors, swimming, or swinging a golf club or tennis racquet. ¨ Do not raise your arm, on the side of your body where the ICD is located, above shoulder level. ¨ Avoid strenuous activities, such as bicycle riding, jogging, weight lifting, or heavy aerobic exercise. ¨ Avoid lifting anything that would make you strain. This may include heavy grocery bags and milk containers, a heavy briefcase or backpack, cat litter or dog food bags, or a child. ? · Ask your doctor when you can drive again. ? · You will probably need to take about 1 to 2 weeks off from work. It depends on the type of work you do and how you feel. ? · Ask your doctor when it is okay for you to have sex. Diet ? · You can eat your normal diet.  If your stomach is upset, try bland, low-fat foods like plain rice, broiled chicken, toast, and yogurt. ? · Drink plenty of fluids (unless your doctor tells you not to). Medicines ? · Your doctor will tell you if and when you can restart your medicines. He or she will also give you instructions about taking any new medicines. ? · If you take blood thinners, such as warfarin (Coumadin), clopidogrel (Plavix), or aspirin, be sure to talk to your doctor. He or she will tell you if and when to start taking those medicines again. Make sure that you understand exactly what your doctor wants you to do. ? · Take pain medicines exactly as directed. ¨ If the doctor gave you a prescription medicine for pain, take it as prescribed. ¨ If you are not taking a prescription pain medicine, ask your doctor if you can take an over-the-counter medicine. ¨ Do not take aspirin, ibuprofen (Advil, Motrin), naproxen (Aleve), or other nonsteroidal anti-inflammatory drugs (NSAIDs) unless your doctor says it is okay. ? · If you think your pain medicine is making you sick to your stomach: 
¨ Take your medicine after meals (unless your doctor has told you not to). ¨ Ask your doctor for a different pain medicine. ? · If your doctor prescribed antibiotics, take them as directed. Do not stop taking them just because you feel better. You need to take the full course of antibiotics. Incision care ? · Keep the area clean and dry. ? · Ask your doctor when you can shower or take a bath. ? · If you have strips of tape on the incision, leave the tape on for a week or until it falls off.  
? · Wash the area daily with warm, soapy water, and pat it dry. Don't use hydrogen peroxide or alcohol, which can slow healing. You may cover the area with a gauze bandage if it weeps or rubs against clothing. Exercise ?  · Check with your doctor before you start an exercise program. Your doctor may recommend that you work with a physical therapist to learn how to exercise safely with an ICD. Other instructions ? · Carry your ICD identification card with you at all times. The card should include the ICD  and model information. ? · Wear medical alert jewelry that states you have an ICD. You can buy this at most [employer]. ? · Have your ICD checked as often as your doctor recommends. In some cases, this may be done over the phone or the Internet. Cyndi Gomes will give you instructions about how to do this. ? · Talk with your doctor about the possibility of turning off the ICD at the end of life. You can put your wishes about the ICD in an advance directive. Follow-up care is a key part of your treatment and safety. Be sure to make and go to all appointments, and call your doctor if you are having problems. It's also a good idea to know your test results and keep a list of the medicines you take. When should you call for help? Call 911 anytime you think you may need emergency care. For example, call if: 
? · You passed out (lost consciousness). ? · You have trouble breathing. ?Call your doctor now or seek immediate medical care if: 
? · You receive a shock from your ICD. ? · You are dizzy or lightheaded, or you feel like you may faint. ? · You have pain that does not get better after you take pain medicine. ? · You hear an alarm or feel a vibration from your ICD. ? · You have loose stitches, or your incision comes open. ? · Bright red blood has soaked through the bandage over your incision. ? · You have signs of infection, such as: 
¨ Increased pain, swelling, warmth, or redness. ¨ Red streaks leading from the incision. ¨ Pus draining from the incision. ¨ A fever. ? Watch closely for changes in your health, and be sure to contact your doctor if you have any problems. Where can you learn more? Go to http://rafia-dada.info/.  
Enter K184 in the search box to learn more about \"Implantable Cardioverter-Defibrillator Placement: What to Expect at Home. \" Current as of: September 21, 2016 Content Version: 11.4 © 9919-2774 [employer]. Care instructions adapted under license by [employer] (which disclaims liability or warranty for this information). If you have questions about a medical condition or this instruction, always ask your healthcare professional. [facility] any warranty or liability for your use of this information. Learning About Self-Care for Heart Failure What is self-care for heart failure? Heart failure usually gets worse over time. But there are many things you can do to feel better, stay healthy longer, and avoid the hospital. 
Self-care means managing your health by doing certain things every day, like weighing yourself. It's about knowing which symptoms to watch for so you can avoid getting worse. When you practice good self-care, you know when it's time to call your doctor and when your heart failure has turned into an emergency. The lists below can help. Top 5 self-care tips for every day 1. Take your medicines as prescribed. This gives them the best chance of helping you. 2. Watch for signs that you're getting worse. Weighing yourself every day is one of the best ways to do this. Weight gain may be a sign that your body is holding on to too much fluid. Weigh yourself at the same time each day, using the same scale, on a hard, flat surface. The best time is in the morning after you go to the bathroom and before you eat or drink anything. 3. Find out what your triggers are, and learn to avoid them. Triggers are things that make your heart failure worse, often suddenly. A trigger may be eating too much salt, missing a dose of your medicine, or exercising too hard. 4. Limit sodium. This helps keep fluid from building up and may help you feel better.  Your doctor may suggest that you limit sodium to 2,000 milligrams (mg) a day or less. That's less than 1 teaspoon. You can stay under this amount by limiting the salt you eat at home and by watching for \"hidden\" sodium when you eat out or shop for food. 5. Try to exercise throughout the week. Exercise makes your heart stronger and can help you avoid symptoms. Walking is a great way to get exercise. If your doctor says it's safe, start out with some short walks, and then slowly build up to longer ones. When should you act? Try to become familiar with signs that mean your heart failure is getting worse. If you need help, talk with your doctor about making a personal plan. Here are some things to watch for as you practice your daily self-care. Call your doctor if: 
· You have sudden weight gain, such as more than 2 to 3 pounds in a day or 5 pounds in a week. (Your doctor may suggest a different range of weight gain.) · You have new or worse swelling in your feet, ankles, or legs. · Your breathing gets worse. Activities that did not make you short of breath before are hard for you now. · Your breathing when you lie down is worse than usual, or you wake up at night needing to catch your breath. Be sure to make and go to all of your follow-up appointments. And it's always a good idea to call your doctor anytime you have a sudden change in symptoms. When is it an emergency? Sometimes the symptoms get worse very quickly. This is called sudden heart failure. It causes fluid to build up in your lungs. Sudden heart failure is an emergency. If you have any of these symptoms, you need care right away. Call 911 if: 
· You have severe shortness of breath. · You have an irregular or fast heartbeat. · You cough up foamy, pink mucus. What else can you do to stay healthy? There are other things you can do to take care of your body and your heart. These things will help you feel better. And they will also reduce your risk of heart attack and stroke. · Try to stay at a healthy weight. Eat a healthy diet with lots of fresh fruit, vegetables, and whole grains. · If you smoke, quit. · Limit the amount of alcohol you drink. · Keep high blood pressure and diabetes under control. If you need help, talk with your doctor. If your doctor has not set you up with a cardiac rehabilitation (rehab) program, talk to him or her about whether that is right for you. Cardiac rehab includes exercise, help with diet and lifestyle changes, and emotional support. Also let your doctor know if: 
· You're having trouble sleeping. Sleep is important to your well-being. It also helps your heart work the way it's supposed to. Your doctor can help you decide if you need treatment for sleep problems. · You're feeling sad or hopeless much of the time, or you are worried and anxious. Heart failure can be hard on your emotions. Treatment with counseling and medicine can help. And when you feel better, you're more likely to take care of yourself. Follow-up care is a key part of your treatment and safety. Be sure to make and go to all appointments, and call your doctor if you are having problems. It's also a good idea to know your test results and keep a list of the medicines you take. Where can you learn more? Go to http://rafia-dada.info/. Enter F140 in the search box to learn more about \"Learning About Self-Care for Heart Failure. \" Current as of: September 21, 2016 Content Version: 11.4 © 6611-4643 Healthwise, [employer]. Care instructions adapted under license by [employer] (which disclaims liability or warranty for this information). If you have questions about a medical condition or this instruction, always ask your healthcare professional. [address] any warranty or liability for your use of this information. Limiting Sodium and Fluids With Heart Failure: Care Instructions Your Care Instructions Sodium causes your body to hold on to extra water. This may cause your heart failure symptoms to get worse. Limiting sodium may help you feel better and lower your risk of having to go to the hospital. 
People get most of their sodium from processed foods. Fast food and restaurant meals also tend to be very high in sodium. Your doctor may suggest that you limit sodium to 2,000 milligrams (mg) a day or less. That is less than 1 teaspoon of salt a day, including all the salt you eat in cooked or packaged foods. Usually, you have to limit the amount of liquids you drink only if your heart failure is severe. Limiting sodium alone often is enough to help your body get rid of extra fluids. However, your doctor may tell you to limit your fluid intake to a set amount each day. Follow-up care is a key part of your treatment and safety. Be sure to make and go to all appointments, and call your doctor if you are having problems. It's also a good idea to know your test results and keep a list of the medicines you take. How can you care for yourself at home? Read food labels · Read food labels on cans and food packages. The labels tell you how much sodium is in each serving. Make sure that you look at the serving size. If you eat more than the serving size, you have eaten more sodium than is listed for one serving. · Food labels also tell you the Percent Daily Value. If the Percent Daily Value says 50%, it means that you will get at least 50% of all the sodium you need for the entire day in one serving. Choose products with low Percent Daily Values for sodium. · Be aware that sodium can come in forms other than salt, including monosodium glutamate (MSG), sodium citrate, and sodium bicarbonate (baking soda). MSG is often added to Asian food. You can sometimes ask for food without MSG or salt. Buy low-sodium foods · Buy foods that are labeled \"unsalted\" (no salt added), \"sodium-free\" (less than 5 mg of sodium per serving), or \"low-sodium\" (less than 140 mg of sodium per serving). A food labeled \"light sodium\" has less than half of the full-sodium version of that food. Foods labeled \"reduced-sodium\" may still have too much sodium. · Buy fresh vegetables or plain, frozen vegetables. Buy low-sodium versions of canned vegetables, soups, and other canned goods. Prepare low-sodium meals · Use less salt each day when cooking. Reducing salt in this way will help you adjust to the taste. Do not add salt after cooking. Take the salt shaker off the table. · Flavor your food with garlic, lemon juice, onion, vinegar, herbs, and spices instead of salt. Do not use soy sauce, steak sauce, onion salt, garlic salt, mustard, or ketchup on your food. · Make your own salad dressings, sauces, and ketchup without adding salt. · Use less salt (or none) when recipes call for it. You can often use half the salt a recipe calls for without losing flavor. Other dishes like rice, pasta, and grains do not need added salt. · Rinse canned vegetables. This removes some-but not all-of the salt. · Avoid water that has a naturally high sodium content or that has been treated with water softeners, which add sodium. Call your local water company to find out the sodium content of your water supply. If you buy bottled water, read the label and choose a sodium-free brand. Avoid high-sodium foods, such as: 
· Smoked, cured, salted, and canned meat, fish, and poultry. · Ham, martin, hot dogs, and luncheon meats. · Regular, hard, and processed cheese and regular peanut butter. · Crackers with salted tops. · Frozen prepared meals. · Canned and dried soups, broths, and bouillon, unless labeled sodium-free or low-sodium. · Canned vegetables, unless labeled sodium-free or low-sodium. · Salted snack foods such as chips and pretzels. · [place] fries, pizza, tacos, and other fast foods. · Pickles, olives, ketchup, and other condiments, especially soy sauce, unless labeled sodium-free or low-sodium. If you cannot cook for yourself · Have family members or friends help you, or have someone cook low-sodium meals. · Check with your local senior nutrition program to find out where meals are served and whether they offer a low-sodium option. You can often find these programs through your local health department or hospital. 
· Have meals delivered to your home. Most [facility] have a Meals on [employer]. These programs provide one hot meal a day for older adults, delivered to their homes. Ask whether these meals are low-sodium. Let them know that you are on a low-sodium diet. Limiting fluid intake · Find a method that works for you. You might simply write down how much you drink every time you do. Some people keep a container filled with the amount of fluid allowed for that day. If they drink from a source other than the container, then they pour out that amount. · Measure your regular drinking glasses to find out how much fluid each one holds. Once you know this, you will not have to measure every time. · Besides water, milk, juices, and other drinks, some foods have a lot of fluid. Count any foods that will melt (such as ice cream or gelatin dessert) or liquid foods (such as soup) as part of your fluid intake for the day. Where can you learn more? Go to http://rafia-dada.info/. Enter A166 in the search box to learn more about \"Limiting Sodium and Fluids With Heart Failure: Care Instructions. \" Current as of: September 21, 2016 Content Version: 11.4 © 1707-2742 [employer]. Care instructions adapted under license by [employer] (which disclaims liability or warranty for this information).  If you have questions about a medical condition or this instruction, always ask your healthcare professional. [facility] disclaims any warranty or liability for your use of this information. Carvedilol (By mouth) Carvedilol ([id number]-BC-hwx-ol) Treats high blood pressure and heart failure. Also reduces the risk of death after a heart attack. This medicine is a beta-blocker. Brand Name(s): Coreg, Ryan DOWNEY There may be other brand names for this medicine. When This Medicine Should Not Be Used: This medicine is not right for everyone. Do not use it if you had an allergic reaction to carvedilol, or if you have asthma, severe liver disease, or certain heart problems. Ask your doctor about these heart problems. How to Use This Medicine:  
Long Acting Capsule, Tablet · Take your medicine as directed. Your dose may need to be changed several times to find what works best for you. · It is best to take this medicine with food or milk. · Extended-release capsule instructions: ¨ Take the capsule in the morning with food. ¨ Swallow the capsule whole. Do not crush or chew it. ¨ If you cannot swallow the capsule, you may open it and sprinkle the medicine over a spoonful of applesauce. Swallow the applesauce right away. · Read and follow the patient instructions that come with this medicine. Talk to your doctor or pharmacist if you have any questions. · Store the medicine in a closed container at room temperature, away from heat, moisture, and direct light. · Missed dose: Take a dose as soon as you remember. If it is almost time for your next dose, wait until then and take a regular dose. Do not take extra medicine to make up for a missed dose. Drugs and Foods to Avoid: Ask your doctor or pharmacist before using any other medicine, including over-the-counter medicines, vitamins, and herbal products. · Some medicines can affect how carvedilol works.  Tell your doctor if you are using amiodarone, clonidine, diltiazem, cyclosporine, digoxin, fluconazole, reserpine, rifampin, verapamil, or an MAO inhibitor (MAOI). Warnings While Using This Medicine: · Tell your doctor if you are pregnant or breastfeeding, or if you have kidney disease, liver disease, bradycardia (slow heartbeat), coronary artery disease, circulation problems, edema (fluid retention or swelling), heart or blood vessel problems, low blood pressure, lung problems (such as bronchitis or emphysema), an overactive thyroid, pheochromocytoma, or frequent chest pains. Tell your doctor if you have a history of severe allergic reactions or if you are scheduled to have surgery. · This medicine may cause the following problems:  
¨ Changes to your blood sugar level (if you have diabetes, report any blood sugar level changes to your doctor) ¨ Fewer tears than usual in contact lens wearers ¨ An eye problem called Intraoperative Floppy Iris Syndrome during cataract surgery · This medicine may make you dizzy or drowsy. Do not drive, use machines, or do anything else that could be dangerous if you are not alert. · Do not stop using this medicine suddenly. Your doctor will need to slowly decrease your dose before you stop it completely. · Keep all medicine out of the reach of children. Never share your medicine with anyone. Possible Side Effects While Using This Medicine:  
Call your doctor right away if you notice any of these side effects: · Allergic reaction: Itching or hives, swelling in your face or hands, swelling or tingling in your mouth or throat, chest tightness, trouble breathing · Change in how much or how often you urinate · Chest pain that may spread to your arms, jaw, back, or neck, trouble breathing, nausea, unusual sweating, faintness · Leg pain when you walk, legs and feet that feel cold or numb · Lightheadedness, dizziness, or fainting · Rapid weight gain, swelling in your hands, ankles, or feet · Shaking, trembling, sweating, hunger, confusion · Slow, fast, or uneven heartbeat · Unusual bleeding or bruising · Wheezing or trouble breathing If you notice these less serious side effects, talk with your doctor: · Diarrhea · Trouble having sex · Unusual tiredness or weakness If you notice other side effects that you think are caused by this medicine, tell your doctor. Call your doctor for medical advice about side effects. You may report side effects to FDA at 0-339-YXD-1322 © 2017 [facility] Information is for End User's use only and may not be sold, redistributed or otherwise used for commercial purposes. The above information is an  only. It is not intended as medical advice for individual conditions or treatments. Talk to your doctor, nurse or pharmacist before following any medical regimen to see if it is safe and effective for you. Spironolactone (By mouth) Spironolactone (vfpg-vc-ni-LAK-tone) Treats high blood pressure, edema (fluid retention), or high levels of aldosterone (a hormone). This medicine is a potassium-sparing diuretic (water pill). Brand Name(s): Aldactone There may be other brand names for this medicine. When This Medicine Should Not Be Used: This medicine is not right for everyone. Do not use it if you had an allergic reaction to spironolactone, or if you have Mathew disease. How to Use This Medicine:  
Tablet · Take your medicine as directed. Your dose may need to be changed several times to find what works best for you. · The oral liquid may be taken with or without food, but it should be taken the same way (with or without food) each day. · Measure the oral liquid medicine with a marked measuring spoon, oral syringe, or medicine cup. Shake well before each use. · Missed dose: Take a dose as soon as you remember. If it is almost time for your next dose, wait until then and take a regular dose. Do not take extra medicine to make up for a missed dose. · Store the medicine in a closed container at room temperature, away from heat, moisture, and direct light. Drugs and Foods to Avoid: Ask your doctor or pharmacist before using any other medicine, including over-the-counter medicines, vitamins, and herbal products. · Do not use this medicine together with eplerenone. · Some foods and medicines can affect how spironolactone works. Tell your doctor if you are using any of the following: ¨ Cholestyramine, cisplatin, digoxin, heparin, lithium, trimethoprim ¨ Another blood pressure medicine, including an angiotensin receptor blocker (ARB) or an ACE inhibitor ¨ NSAID pain or arthritis medicine (including aspirin, celecoxib, diclofenac, ibuprofen, indomethacin, naproxen) ¨ Steroid medicine (including hydrocortisone, methylprednisolone, prednisolone, prednisone) · Ask your doctor before you use any medicine, supplement, or salt substitute that contains potassium. You could have high levels of potassium in your blood that would cause serious health problems. · Alcohol, narcotic pain relievers, or sleeping pills may cause you to feel more lightheaded, dizzy, or faint when used with this medicine. Warnings While Using This Medicine: · Tell your doctor if you are pregnant or breastfeeding, or if you have kidney disease, liver disease, diabetes, gout, or trouble urinating. · This medicine may cause the following problems: 
¨ Low blood pressure ¨ Worsening of kidney function ¨ Electrolyte imbalance ¨ High uric acid and blood sugar · This medicine may make you dizzy or drowsy. Do not drive or do anything else that could be dangerous until you know how this medicine affects you. · Do not stop using the medicine without asking your doctor, even if you feel well. This medicine will not cure your high blood pressure, but it will help keep it in the normal range. You may have to take blood pressure medicine for the rest of your life. · Tell any doctor or dentist who treats you that you are using this medicine. · Your doctor will do lab tests at regular visits to check on the effects of this medicine. Keep all appointments. · Keep all medicine out of the reach of children. Never share your medicine with anyone. Possible Side Effects While Using This Medicine:  
Call your doctor right away if you notice any of these side effects: · Allergic reaction: Itching or hives, swelling in your face or hands, swelling or tingling in your mouth or throat, chest tightness, trouble breathing · Blistering, peeling, red skin rash · Blood in your stools or dark stools, vomiting blood or material that looks like coffee grounds · Confusion, weakness, uneven heartbeat, trouble breathing, numbness or tingling in your hands, feet, or lips · Dry mouth, increased thirst, muscle cramps, nausea, vomiting · Increased hunger or thirst, change in how much or how often you urinate, unusual weight loss · Lightheadedness, dizziness, drowsiness, fainting · Muscle twitching · Unusual bleeding, bruising, or weakness If you notice these less serious side effects, talk with your doctor: · Breast swelling, enlargement, pain, or tenderness If you notice other side effects that you think are caused by this medicine, tell your doctor. Call your doctor for medical advice about side effects. You may report side effects to FDA at 9-563-FDA-2591 © 2017 [facility] Information is for End User's use only and may not be sold, redistributed or otherwise used for commercial purposes. The above information is an  only. It is not intended as medical advice for individual conditions or treatments. Talk to your doctor, nurse or pharmacist before following any medical regimen to see if it is safe and effective for you. Furosemide (By mouth) Furosemide (yliu-HF-uk-mide) Treats fluid retention (edema) and high blood pressure. This medicine is a diuretic (water pill). Brand Name(s): Active-Medicated Specimen Collection Kit, Diuscreen Multi-Drug Medicated Test Kit, Lasix, RX-Specimen Collection Kit, Specimen Collection Kit There may be other brand names for this medicine. When This Medicine Should Not Be Used: This medicine is not right for everyone. Do not use it if you had an allergic reaction to furosemide. How to Use This Medicine:  
Liquid, Tablet · Take your medicine as directed. Your dose may need to be changed several times to find what works best for you. · You may take this medicine with food if it upsets your stomach. · Oral liquid: Measure the oral liquid medicine with a marked measuring spoon, oral syringe, or medicine cup. · Tablet: Swallow the tablet whole. Do not crush, break, or chew it. · Missed dose: Take a dose as soon as you remember. If it is almost time for your next dose, wait until then and take a regular dose. Do not take extra medicine to make up for a missed dose. · Store the medicine in a closed container at room temperature, away from heat, moisture, and direct light. Drugs and Foods to Avoid: Ask your doctor or pharmacist before using any other medicine, including over-the-counter medicines, vitamins, and herbal products. · Some medicines can affect how furosemide works. Tell your doctor if you are also using any of the following: ¨ Cisplatin, cyclosporine, digoxin, ethacrynic acid, licorice, lithium, methotrexate, or phenytoin ¨ Adrenocorticotropic hormone (ACTH) ¨ Laxative ¨ Medicine to treat an infection ¨ NSAID pain or arthritis medicine (including aspirin, diclofenac, ibuprofen, indomethacin, naproxen) ¨ Other blood pressure medicines ¨ Steroid medicine (including dexamethasone, hydrocortisone, methylprednisolone, prednisolone, prednisone) ¨ Thyroid medicine · If you also take sucralfate, allow at least 2 hours between the time you take furosemide and the time you take sucralfate. · Alcohol, narcotic pain medicine, or sleeping pills may cause you to feel more lightheaded, dizzy, or faint when used with this medicine. Warnings While Using This Medicine: · Tell your doctor if you are pregnant or breastfeeding, or if you have kidney disease, liver disease (including cirrhosis), diabetes, gout, low blood pressure, lupus, an enlarged prostate, trouble urinating, or an allergy to sulfa drugs. Tell your doctor if you are on a low-salt diet. · This medicine may cause the following problems:  
¨ Low levels of minerals in your blood, such as potassium and sodium ¨ Blood sugar level changes ¨ Hearing problems · Make sure any doctor or dentist who treats you knows that you are using this medicine. · This medicine could lower your blood pressure too much, especially when you first use it or if you are dehydrated. Stand or sit up slowly if you feel lightheaded or dizzy. · This medicine may make your skin more sensitive to sunlight. Wear sunscreen. Do not use sunlamps or tanning beds. · Your doctor will do lab tests at regular visits to check on the effects of this medicine. Keep all appointments. · Keep all medicine out of the reach of children. Never share your medicine with anyone. Possible Side Effects While Using This Medicine:  
Call your doctor right away if you notice any of these side effects: · Allergic reaction: Itching or hives, swelling in your face or hands, swelling or tingling in your mouth or throat, chest tightness, trouble breathing · Blistering, peeling, red skin rash · Confusion, weakness, muscle twitching · Dry mouth, increased thirst, muscle cramps, uneven heartbeat · Sudden and severe stomach pain, nausea, vomiting, fever, lightheadedness · Hearing loss, ringing in the ears · Lightheadedness, dizziness, fainting · Severe diarrhea · Unusual bleeding or bruising · Yellow skin or eyes If you notice these less serious side effects, talk with your doctor: · Loss of appetite, stomach cramps If you notice other side effects that you think are caused by this medicine, tell your doctor. Call your doctor for medical advice about side effects. You may report side effects to FDA at 6-022-HPO-1597 © 2017 [facility] Information is for End User's use only and may not be sold, redistributed or otherwise used for commercial purposes. The above information is an  only. It is not intended as medical advice for individual conditions or treatments. Talk to your doctor, nurse or pharmacist before following any medical regimen to see if it is safe and effective for you. Lisinopril (By mouth) Lisinopril (lye-SIN-oh-pril) Treats high blood pressure and heart failure. Also given to reduce the risk of death after a heart attack. This medicine is an ACE inhibitor. Brand Name(s): Prinivil, Qbrelis, Zestril There may be other brand names for this medicine. When This Medicine Should Not Be Used: This medicine is not right for everyone. Do not use it if you had an allergic reaction to lisinopril or another ACE inhibitor, or if you are pregnant. How to Use This Medicine:  
Liquid, Tablet · Take your medicine as directed. Your dose may need to be changed several times to find what works best for you. · Oral liquid: Measure the oral liquid medicine with a marked measuring spoon, oral syringe, or medicine cup. · Missed dose: Take a dose as soon as you remember. If it is almost time for your next dose, wait until then and take a regular dose. Do not take extra medicine to make up for a missed dose. · Store the medicine in a closed container at room temperature, away from heat, moisture, and direct light. Drugs and Foods to Avoid: Ask your doctor or pharmacist before using any other medicine, including over-the-counter medicines, vitamins, and herbal products. · Do not use this medicine together with aliskiren if you have diabetes. · Some foods and medicines may affect how lisinopril works. Tell your doctor if you are using any of the following: ¨ Aliskiren, everolimus, lithium, sirolimus, temsirolimus ¨ Another blood pressure medicine, including an angiotensin receptor blocker (ARB) ¨ Diuretic (water pill, including amiloride, spironolactone, triamterene) ¨ Insulin or diabetes medicine ¨ NSAID pain or arthritis medicine (including aspirin, celecoxib, diclofenac, ibuprofen, naproxen) · Ask your doctor before you use any medicine, supplement, or salt substitute that contains potassium. Warnings While Using This Medicine: · It is not safe to take this medicine during pregnancy. It could harm an unborn baby. Tell your doctor right away if you become pregnant. · Tell your doctor if you are breastfeeding, or if you have kidney disease, liver disease, diabetes, or heart or blood vessel disease. · This medicine may cause the following problems: ¨ Angioedema (severe swelling) ¨ Kidney problems ¨ Serious liver problems · This medicine could lower your blood pressure too much, especially when you first use it or if you are dehydrated. Stand or sit up slowly if you feel lightheaded or dizzy. · Do not stop using this medicine without asking your doctor, even if you feel well. This medicine will not cure your high blood pressure, but it will help keep it in a normal range. You may have to take blood pressure medicine for the rest of your life. · Tell any doctor or dentist who treats you that you are using this medicine. · Your doctor will do lab tests at regular visits to check on the effects of this medicine. Keep all appointments. · Keep all medicine out of the reach of children. Never share your medicine with anyone. Possible Side Effects While Using This Medicine: Call your doctor right away if you notice any of these side effects: · Allergic reaction: Itching or hives, swelling in your face or hands, swelling or tingling in your mouth or throat, chest tightness, trouble breathing · Blistering, peeling, or red skin rash · Change in how much or how often you urinate · Confusion, weakness, uneven heartbeat, trouble breathing, numbness or tingling in your hands, feet, or lips · Dark urine or pale stools, nausea, vomiting, loss of appetite, stomach pain, yellow skin or eyes · Fever, chills, sore throat, body aches · Lightheadedness, dizziness, fainting · Severe stomach pain (with or without nausea or vomiting) If you notice these less serious side effects, talk with your doctor: · Dry cough If you notice other side effects that you think are caused by this medicine, tell your doctor. Call your doctor for medical advice about side effects. You may report side effects to FDA at 4-983-FDA-2382 © 2017 [address]  Information is for End User's use only and may not be sold, redistributed or otherwise used for commercial purposes. The above information is an  only. It is not intended as medical advice for individual conditions or treatments. Talk to your doctor, nurse or pharmacist before following any medical regimen to see if it is safe and effective for you. Aspirin (By mouth) Aspirin (AS-pir-in) Treats pain, fever, and inflammation. May lower risk of heart attack and stroke. Brand Name(s): Ascriptin Regular Strength, Aspergum, Aspir Low, Aspirin Adult Low Dose, Aspirin Low Dose, Mana Aspirin Children's, Anma Aspirin Regimen, Amna Extra Strength, Amna Genuine Aspirin, Amna Low Dose, Bufferin, Bufferin Low Dose, Durlaza, Ecotrin, Ecpirin There may be other brand names for this medicine. When This Medicine Should Not Be Used: This medicine is not right for everyone.  Do not use it if you had an allergic reaction to aspirin or other NSAIDs, or if you have a history of asthma with nasal polyps and rhinitis. How to Use This Medicine:  
Delayed Release Capsule, Long Acting Capsule, Gum, Tablet, Chewable Tablet, Fizzy Tablet, Coated Tablet, Long Acting Tablet, 24 Hour Capsule · Your doctor will tell you how much medicine to use. Do not use more than directed. · It is best to take this medicine with food or milk. · Capsule, tablet, or coated tablet: Swallow whole. Do not crush, break, or chew it. · Chewable tablet: You may chew it completely or swallow it whole. · Gum: Chew completely to make sure you get as much medicine as possible. Drink a full glass (8 ounces) of water after chewing the gum. · Swallow the extended-release capsule whole. Do not crush, break, or chew it. Take the capsule with a full glass of water at the same time each day. · Follow the instructions on the medicine label if you are using this medicine without a prescription. · Missed dose: If you miss a dose of Durlaza, skip the missed dose and go back to your regular dosing schedule. Do not take extra medicine to make up for a missed dose. · Store the medicine in a closed container at room temperature, away from heat, moisture, and direct light. Drugs and Foods to Avoid: Ask your doctor or pharmacist before using any other medicine, including over-the-counter medicines, vitamins, and herbal products. · Some foods and medicines can affect how aspirin works. Tell your doctor if you are using any of the following: ¨ Dipyridamole, methotrexate, probenecid, sulfinpyrazone, ticlopidine ¨ Blood thinner (including clopidogrel, prasugrel, ticagrelor, warfarin) ¨ Blood pressure medicine ¨ Medicine to treat seizures (including phenytoin, valproic acid) ¨ NSAID pain or arthritis medicine (including celecoxib, diclofenac, ibuprofen, naproxen) ¨ Steroid medicine (including dexamethasone, hydrocortisone, methylprednisolone, prednisolone, prednisone) · Do not take Durlaza 2 hours before or 1 hour after you drink alcohol or take medicines that contain alcohol. Warnings While Using This Medicine: · Tell your doctor if you are pregnant or breastfeeding. Do not use this medicine during the later part of a pregnancy unless your doctor tells you to. · Tell your doctor if you have kidney disease, liver disease, high blood pressure, heart disease, or a history of stomach bleeding or ulcers. · This medicine may increase your risk for bleeding, including stomach ulcers. · Do not give aspirin to a child or teenager who has chickenpox or flu symptoms, unless the doctor says it is okay. Aspirin can cause a life-threatening reaction called Reye syndrome. · Tell any doctor or dentist who treats you that you are using this medicine. This medicine may affect certain medical test results. · Keep all medicine out of the reach of children. Never share your medicine with anyone. Possible Side Effects While Using This Medicine:  
Call your doctor right away if you notice any of these side effects: · Allergic reaction: Itching or hives, swelling in your face or hands, swelling or tingling in your mouth or throat, chest tightness, trouble breathing · Bloody or black stools, bloody vomit or vomit that looks like coffee grounds · Chest tightness, wheezing · Ringing in the ears · Severe stomach pain · Unusual bleeding, bruising, or weakness If you notice other side effects that you think are caused by this medicine, tell your doctor. Call your doctor for medical advice about side effects. You may report side effects to FDA at 5-779-FDA-2981 © 2017 [address] Information is for End User's use only and may not be sold, redistributed or otherwise used for commercial purposes. The above information is an  only.  It is not intended as medical advice for individual conditions or treatments. Talk to your doctor, nurse or pharmacist before following any medical regimen to see if it is safe and effective for you. Please provide this summary of care documentation to your next provider. Signatures-by signing, you are acknowledging that this After Visit Summary has been reviewed with you and you have received a copy. Patient Signature:  ____________________________________________________________ Date:  ____________________________________________________________  
  
Elver Waters Provider Signature:  ____________________________________________________________ Date:  ____________________________________________________________

## 2017-12-20 NOTE — PROGRESS NOTES
41 yo bm in OR for foot surgery under GA with ET tube on prone position. Called to OR for severe bradycardia. On arrival HR 35 per minute, O2 sat/Bp undetetable. 1 mg Atropin IV, then I mg Epi IV, No pulse. Turned pt to supine position, CPR started. Two more doses of 1 mg Epi IV given. Retored SR, -180/ min, SBP 180s. Total chest compression time about 5-10 min. A-line on the left wrist, transferred pt to ICU with ET. Pt currently stable, still intubated on vent. Talked to the family in ICU.

## 2017-12-20 NOTE — CONSULTS
[facility]  Pulmonary, Critical Care, and Sleep Medicine      Name: Ottie Cooks MRN: [id number]   : 1975 Hospital: [address] [place] [address]   Date: 2017          Critical Care Initial Patient Consult    Requesting MD: Cleo Reyes                                                Reason for CC Consult: Cardiac Arrest    IMPRESSION:   · Bradycardia progressing to Cardiac Arrest with ROSC - unclear exact cause, possible coronary event vs PE vs vasovagal vs dysrhythmia vs electrolyte disturbance vs pulmonary edema  · Sleep apnea- uses CPAP at night time  · Hypertension  · Left ankle reconstructive surgery       RECOMMENDATIONS:   · Resp -  Ventilator-associated Pneumonia bundle, initial ABG 7.224/64/110/26, increased respiratory rate, will recheck in 1 hour. CXR with multifocal airspace could represent pulmonary edema or pneumonia per radiology. Likely represent cardiogenic vs non cardiogenic pulm edema. Duo nebs q6h PRN. Daily ABG. Daily CXR while intubated. · ID - no evidence of acute infectious etiology, hold abx at this time  · CVS - initially bradycardic with progression to cardiac arrest with ROSC, initial EKG in ICU shows sinus rhythm at 98, normal axis, , no T wave or ST changes. Troponin of 0.07, will trend Q6h, stat echo ordered. Levophed to maintain MAP>65. No PE risk factors. Hx of sleep apnea and HTN, but no know coronary disease. · Heme/Onc- stable H&H, no signs of active bleeding, monitor with CBC daily  · Metabolic - K+ mildly elevated at 5.6, will recheck, otherwise normal lytes, BMP daily. Replace electrolytes per protocol. · Renal - slight bump in Cr @ 1.53, appears previous on  was 1.34. Given 2 L LR bolus, now on [email or web address] cc/hr  · Endocrine - no hx of diabetes, monitor BG  · Neuro/ Pain/ Sedation - Fentanyl/Versed, RASS 0 to -1  · GI - no acute issues  · Prophylaxis - DVT(lovenox), GI (pepcid)     Subjective/History:      This patient has been seen and evaluated at the request of Dr. Nimisha Lockwood for bradycardia and subsequent cardiac arrest.  Patient is a 40 y.o. male with history of sleep apnea on CPAP, hypertension who went into cardiac arrest during left ankle reconstructive surgery. According to anaesthesia who was present on patient transfer from the operating room, patient received left sciatic nerve block, then was given fentanyl and versed for intubation. Patient was rolled onto left side, approximately 15-20 minutes after operation began, patient was noted to have bradycardia in the 40's and ST depressions on the cardiac monitor. Patient was given glycopyrrolate and atropine without improvement. Patient lost pulses, CPR was started, patient given 3 mg epinephrine with ROSC, no shocks were provided. Noted to be hypotensive and started on epi and phenylephrine gtt. Left radial arterial line was inserted by anaesthesia. Per records, patient was having left ankle reconstructive surgery. Per fiance patient was in usual state of health, no recent fevers, cough, shortness of breath, chest pain, abd pain, vomiting, or leg swelling    The patient is critically ill and can not provide additional history due to Ventilated. Past Medical History:   Diagnosis Date    Bronchitis     Hypertension     Sleep apnea     on cpap      Past Surgical History:   Procedure Laterality Date    HX ORTHOPAEDIC      right shoulder surgery      Prior to Admission medications    Medication Sig Start Date End Date Taking? Authorizing Provider   furosemide (LASIX) 40 mg tablet Take  by mouth daily. Yes Historical Provider   lisinopril (PRINIVIL, ZESTRIL) 2.5 mg tablet Take  by mouth daily. Yes Historical Provider   carvedilol (COREG) 25 mg tablet Take 25 mg by mouth two (2) times daily (with meals).    Yes Historical Provider     Current Facility-Administered Medications   Medication Dose Route Frequency    chlorhexidine (PERIDEX) 0.12 % mouthwash 10 mL  10 mL Oral Q12H    enoxaparin (LOVENOX) injection 40 mg  40 mg SubCUTAneous Q24H    fentaNYL citrate (PF) 50 mcg/mL injection        fentaNYL (PF) 10 mcg/mL infusion  25-50 mcg/hr IntraVENous TITRATE    midazolam in normal saline (VERSED) 1 mg/mL infusion  1-3 mg/hr IntraVENous TITRATE    PHENYLephrine ([place]-SYNEPHRINE) 10 mg/mL injection        PHENYLephrine ([place]-SYNEPHRINE) 10 mg/mL injection        PHENYLephrine ([place]-SYNEPHRINE) 10 mg/mL injection        NOREPINephrine (LEVOPHED) 16 mg in dextrose 5% 250 mL infusion  2-16 mcg/min IntraVENous TITRATE    NOREPINephrine (LEVOPHED) 1 mg/mL injection        perflutren lipid microspheres (DEFINITY) contrast injection 2 mL  2 mL IntraVENous CARD ONCE    famotidine (PF) (PEPCID) injection 20 mg  20 mg IntraVENous Q12H     No Known Allergies   Social History   Substance Use Topics    Smoking status: Never Smoker    Smokeless tobacco: Never Used    Alcohol use Not on file      History reviewed. No pertinent family history. Review of Systems:  Pertinent items are noted in HPI. Objective:   Vital Signs:    Visit Vitals    BP 92/58    Pulse 98    Temp 96.8 °F (36 °C)    Resp 16    Ht 5' 9\" (1.753 m)    Wt 153.3 kg (338 lb)    SpO2 100%    BMI 49.91 kg/m2       O2 Device: Other (comment) (Pt intubated and sedated)       Temp (24hrs), Av.5 °F (36.4 °C), Min:96.8 °F (36 °C), Max:98.2 °F (36.8 °C)       Intake/Output:   Last shift:         Last 3 shifts:    No intake or output data in the 24 hours ending 17 1503    Ventilator Settings:  Mode Rate Tidal Volume Pressure FiO2 PEEP   Assist control, VC+   500 ml    50 % 5 cm H20     Peak airway pressure: 28 cm H2O    Minute ventilation: 9.31 l/min        Physical Exam:    General:  Alert, cooperative, no distress, appears stated age. Head:  Normocephalic, without obvious abnormality, atraumatic. Eyes:  Conjunctivae/corneas clear. PERRL, EOMs intact. Nose: Nares normal. Septum midline.  Mucosa normal. No drainage or sinus tenderness. Throat: Lips, mucosa, and tongue normal. Teeth and gums normal.   Neck: Supple, symmetrical, trachea midline, no adenopathy, thyroid: no enlargment/tenderness/nodules, no carotid bruit and no JVD. Back:   Symmetric, no curvature. ROM normal.   Lungs:   Clear to auscultation bilaterally. Chest wall:  No tenderness or deformity. Heart:  Regular rate and rhythm, S1, S2 normal, no murmur, click, rub or gallop. Abdomen:   Soft, non-tender. Bowel sounds normal. No masses,  No organomegaly. Extremities: Extremities normal, atraumatic, no cyanosis or edema. Pulses: 2+ and symmetric all extremities.    Skin: Skin color, texture, turgor normal. No rashes or lesions   Lymph nodes: Cervical, supraclavicular, and axillary nodes normal.   Neurologic: Grossly nonfocal       Data:     Recent Results (from the past 24 hour(s))   EKG, 12 LEAD, INITIAL    Collection Time: 12/20/17  9:33 AM   Result Value Ref Range    Ventricular Rate 98 BPM    Atrial Rate 98 BPM    P-R Interval 160 ms    QRS Duration 100 ms    Q-T Interval 432 ms    QTC Calculation (Bezet) 551 ms    Calculated P Axis 27 degrees    Calculated R Axis 24 degrees    Calculated T Axis -40 degrees    Diagnosis       Normal sinus rhythm  Nonspecific T wave abnormality  Prolonged QT  Abnormal ECG  When compared with ECG of 14-DEC-2017 13:46,  premature ventricular complexes are no longer present  T wave inversion no longer evident in Lateral leads  Confirmed by Brett Avalos ([id number]) on 12/20/2017 10:32:28 AM     CBC WITH AUTOMATED DIFF    Collection Time: 12/20/17 11:50 AM   Result Value Ref Range    WBC 9.1 4.6 - 13.2 K/uL    RBC 5.16 4.70 - 5.50 M/uL    HGB 14.2 13.0 - 16.0 g/dL    HCT 44.9 36.0 - 48.0 %    MCV 87.0 74.0 - 97.0 FL    MCH 27.5 24.0 - 34.0 PG    MCHC 31.6 31.0 - 37.0 g/dL    RDW 14.4 11.6 - 14.5 %    PLATELET 705 830 - 066 K/uL    MPV 11.0 9.2 - 11.8 FL    NEUTROPHILS 65 40 - 73 %    LYMPHOCYTES 23 21 - 52 % MONOCYTES 9 3 - 10 %    EOSINOPHILS 2 0 - 5 %    BASOPHILS 1 0 - 2 %    ABS. NEUTROPHILS 6.0 1.8 - 8.0 K/UL    ABS. LYMPHOCYTES 2.1 0.9 - 3.6 K/UL    ABS. MONOCYTES 0.8 0.05 - 1.2 K/UL    ABS. EOSINOPHILS 0.2 0.0 - 0.4 K/UL    ABS.  BASOPHILS 0.1 (H) 0.0 - 0.06 K/UL    DF AUTOMATED     METABOLIC PANEL, BASIC    Collection Time: 12/20/17 11:50 AM   Result Value Ref Range    Sodium 138 136 - 145 mmol/L    Potassium 5.6 (H) 3.5 - 5.5 mmol/L    Chloride 105 100 - 108 mmol/L    CO2 29 21 - 32 mmol/L    Anion gap 4 3.0 - 18 mmol/L    Glucose 209 (H) 74 - 99 mg/dL    BUN 20 (H) 7.0 - 18 MG/DL    Creatinine 1.53 (H) 0.6 - 1.3 MG/DL    BUN/Creatinine ratio 13 12 - 20      GFR est AA >60 >60 ml/min/1.73m2    GFR est non-AA 50 (L) >60 ml/min/1.73m2    Calcium 8.0 (L) 8.5 - 10.1 MG/DL   MAGNESIUM    Collection Time: 12/20/17 11:50 AM   Result Value Ref Range    Magnesium 2.0 1.6 - 2.6 mg/dL   CARDIAC PANEL,(CK, CKMB & TROPONIN)    Collection Time: 12/20/17 11:50 AM   Result Value Ref Range     39 - 308 U/L    CK - MB 1.7 <3.6 ng/ml    CK-MB Index 0.7 0.0 - 4.0 %    Troponin-I, Qt. 0.07 (H) 0.0 - 0.045 NG/ML   TYPE & SCREEN    Collection Time: 12/20/17 11:50 AM   Result Value Ref Range    Crossmatch Expiration 12/23/2017     ABO/Rh(D) O POSITIVE     Antibody screen NEG    CALCIUM, IONIZED    Collection Time: 12/20/17 11:50 AM   Result Value Ref Range    Ionized Calcium 1.15 1.12 - 1.32 MMOL/L   DRUG SCREEN, URINE    Collection Time: 12/20/17 11:53 AM   Result Value Ref Range    BENZODIAZEPINES POSITIVE (A) NEG      BARBITURATES NEGATIVE  NEG      THC (TH-CANNABINOL) NEGATIVE  NEG      OPIATES NEGATIVE  NEG      PCP(PHENCYCLIDINE) NEGATIVE  NEG      COCAINE NEGATIVE  NEG      AMPHETAMINES NEGATIVE  NEG      METHADONE NEGATIVE  NEG      HDSCOM (NOTE)    POC G3    Collection Time: 12/20/17 12:10 PM   Result Value Ref Range    Device: VENT      FIO2 (POC) 5 %    pH (POC) 7.224 (LL) 7.35 - 7.45      pCO2 (POC) 64.9 (H) 35.0 - 45.0 MMHG    pO2 (POC) 110 (H) 80 - 100 MMHG    HCO3 (POC) 26.8 (H) 22 - 26 MMOL/L    sO2 (POC) 97 92 - 97 %    Base deficit (POC) 1 mmol/L    Mode ASSIST CONTROL      Tidal volume 500 ml    Set Rate 12 bpm    PEEP/CPAP (POC) 5 cmH2O    Allens test (POC) YES      Inspiratory Time 0.9 sec    Total resp. rate 18      Site RIGHT RADIAL      Specimen type (POC) ARTERIAL      Performed by Jeoffrey Collar     Volume control plus YES     POC G3    Collection Time: 12/20/17  2:21 PM   Result Value Ref Range    Device: VENT      FIO2 (POC) 50 %    pH (POC) 7.306 (L) 7.35 - 7.45      pCO2 (POC) 53.2 (H) 35.0 - 45.0 MMHG    pO2 (POC) 114 (H) 80 - 100 MMHG    HCO3 (POC) 26.5 (H) 22 - 26 MMOL/L    sO2 (POC) 98 (H) 92 - 97 %    Base excess (POC) 0 mmol/L    Mode ASSIST CONTROL      Tidal volume 500 ml    Set Rate 16 bpm    PEEP/CPAP (POC) 5 cmH2O    Allens test (POC) YES      Inspiratory Time 0.9 sec    Total resp. rate 16      Site RIGHT RADIAL      Specimen type (POC) ARTERIAL      Performed by Jeoffrey Collar     Volume control plus YES               Telemetry:normal sinus rhythm    Imaging:  I have personally reviewed the patients radiographs and have reviewed the reports:  CXR Results  (Last 48 hours)               12/20/17 1135  XR CHEST PORT Final result    Impression:  Impression:   1. Endotracheal tube in satisfactory position 6 cm above the jaswant. Additional   air density structure is seen with possible communication to the trachea above   the level of the jaswant. This is nonspecific and could represent an overlying   structure, but the possibility of a bronchus suis or tracheal bronchus cannot be   excluded and is an anatomic variant. Of note, the endotracheal tube is cephalad   to this possible structure. 2.  Left IJ central venous catheter terminating in the region of the mid SVC. No   discernible pneumothorax. 3.  Cardiomegaly.        4.  Multifocal airspace disease could represent pulmonary edema or pneumonia. Narrative:      Examination: Portable AP chest        History: Intubated       Comparison: None       Findings: Endotracheal tube is 6 cm above the jaswant. Additional air density   structure superior to the jaswant on the right. Left IJ central venous catheter   terminates in the expected region of the mid SVC. There is multifocal airspace   disease. Possible trace left pleural effusion. No discernible pneumothorax. Cardiomegaly is seen.                          BHAVESH Chester

## 2017-12-20 NOTE — PROGRESS NOTES
CARDIAC STRESS:NO IMAGING8/10/2013  [facility]  Result Transcription   Claribel Arzola MD - 08/09/2013  4:55 PM EDT    Procedure Date:   08/09/2013  Ordered By: Tej Delgado MD                       LEXISCAN NUCLEAR STRESS TEST    Stage            Base    1 min    2 min    3 min    4 min   3 min rec 6 min rec  Blood pressure  127/95            94/58    106/53   129/68   122/73   126/84  Heart rate       109      108      115      111      113      115      110  Medicine       0.4 mg of Lexiscan injected over 10 seconds followed by 5 cc normal saline   isotope  Symptoms       FATIGUE, SOB, WARM      Brief Clinical History:  69-year-old   man with no known history of coronary artery disease who is being evaluated today for new onset cardiomyopathy and shortness of breath. Cardiac Risk Factors:   male gender, hypertension. Screening Physical Examination:   Resting heart rate 109. Resting /95. Lungs clear to auscultation. Cardiac:  Regular rate and rhythm. No murmur appreciated. Clinical Response: patient was injected with IV Lexiscan. Maximum heart rate 115. Minimum heart rate 108. Maximum /68. Minimum BP 94/58. The patient experienced fatigue and shortness of breath within the testing period. Symptoms resolved by the end of the testing period. Electrocardiographic Response: Baseline EKG showed minimal nonspecific ST segment change. After regadenoson pharmacologic challenge, there was ST segment depression which was downsloping in nature and most prominent in the lateral leads. Scintigraphic Data:  See attached report. CARDIOLOGIST:  Anders Green M.D.      MYOCARDIAL PERFUSION IMAGING WITH GATED SPECT AND ATTENUATION CORRECTIONINDICATION: Chest pain, shortness of breath. RADIOPHARMACEUTICAL: 16 mCi and 33 mCi Tc-99m sestamibi IV. Site of Injection: Right antecubital fossaTECHNIQUE: Myocardial perfusion imaging was performed during stress and rest. Gated images were obtained. Attenuation correction was performed. RAW DATA: There is no significant technical artifact. TOMOGRAPHIC IMAGES: There is a normal distribution of tracer activity within the left ventricular myocardium on both the stress and rest images. GATED IMAGES: The left ventricle cavity is dilated with diffuse hypokinesia but no discrete akinetic or dyskinetic segments. The left ventricular ejection fraction is 21%. IMPRESSIONImpression:1. Dilated left ventricular cavity with global hypokinesia and diminished ejection fraction of 21%. No scintigraphic evidence of infarct or ischemia. Reading EveliaMemorial Sloan Kettering Cancer CenterRiley MD Signing Rufina Garcia MD 08/09/2013 1619       ECHO [address] [facility]  Result Impression   :   SEVERE GLOBAL HYPOKINESIS OF THE LEFT VENTRICLE WITH A DECREASED EJECTION FRACTION ESTIMATED   AT 20%. MILD DILATION OF LEFT VENTRICULAR CAVITY SIZE. MODERATE CONCENTRIC LEFT VENTRICULAR HYPERTROPHY. RESTRICTIVE DIASTOLIC FUNCTION. NORMAL RIGHT VENTRICULAR SIZE.  TAPSE IS 1.6 CM AND S' IS 11 CM/S CONSISTENT WITH NORMAL RIGHT   VENTRICULAR GLOBAL SYSTOLIC FUNCTION. MILD MITRAL REGURGITATION. MILD TRICUSPID REGURGITATION. ESTIMATED RIGHT VENTRICULAR SYSTOLIC PRESSURE IS 34 MMHG CONSISTENT WITH MILD PULMONARY   HYPERTENSION. OTHER FINDINGS AS NOTED BELOW. NO PREVIOUS REPORT FOR COMPARISON.

## 2017-12-20 NOTE — OP NOTES
[address] [place] [address]   OPERATIVE REPORT    Al Villegas  MR#: [id number]  : 1975  ACCOUNT #: [de-identified]   DATE OF SERVICE: 2017    PREOPERATIVE DIAGNOSIS:  Left ankle instability. POSTOPERATIVE DIAGNOSIS:  Left ankle instability. PROCEDURE PERFORMED:  Reeda Blend reconstruction of the lateral ankle. SURGEON:  Dr. Nilo Tee:  General.     ESTIMATED BLOOD LOSS:      SPECIMENS REMOVED:      COMPLICATIONS:      SUMMARY OF PROCEDURE:  After general anesthesia was induced, the patient was placed in the decubitus position and the leg prepped in the routine sterile fashion. The thigh tourniquet was inflated to 350 mmHg and an incision made over the lateral ankle. The capsule was incised and the points on the calcaneus and talus exposed for the placement of a gracilis allograft. At this time, however, the patient was noted to be bradycardic and eventually flat-lined. CPR was performed and a pulse was finally achieved. The wound was then closed without any further repair and a dressing applied. The patient was taken to the Intensive Care Unit for further treatment of this cardiopulmonary event.       Ruth CARMEN  D: 2017 09:10     T: 2017 13:05  JOB #: [id number]

## 2017-12-20 NOTE — ANESTHESIA PROCEDURE NOTES
Peripheral Block    Start time: 12/20/2017 7:08 AM  End time: 12/20/2017 7:15 AM  Performed by: Farhan Gordillo by: Sree Queen       Pre-procedure: Indications: at surgeon's request, post-op pain management and procedure for pain    Preanesthetic Checklist: patient identified, risks and benefits discussed, site marked, timeout performed, anesthesia consent given and patient being monitored      Block Type:   Block Type:  Sciatic single shot  Laterality:  Left  Monitoring:  Standard ASA monitoring, continuous pulse ox, frequent vital sign checks, oxygen, responsive to questions and heart rate  Injection Technique:  Single shot  Procedures: ultrasound guided and nerve stimulator    Patient Position: prone  Prep: chlorhexidine    Location:  Mid thigh  Needle Type:  Stimuplex  Needle Gauge:  21 G  Needle Localization:  Ultrasound guidance and nerve stimulator  Medication Injected:  0.5%  ropivacaine  Volume (mL):  30    Assessment:  Number of attempts:  1  Injection Assessment:  Incremental injection every 5 mL, no paresthesia, ultrasound image on chart, no intravascular symptoms, negative aspiration for blood and local visualized surrounding nerve on ultrasound  Patient tolerance:  Patient tolerated the procedure well with no immediate complications  Location:  PREOP HOLDING    Patient given 2 mg IV Versed and 100 mcg IV Fentanyl for sedation.     12/20/2017     7:23 AM     Mia Lancaster MD

## 2017-12-20 NOTE — ANESTHESIA PREPROCEDURE EVALUATION
Anesthetic History   No history of anesthetic complications            Review of Systems / Medical History  Patient summary reviewed and pertinent labs reviewed    Pulmonary        Sleep apnea: CPAP           Neuro/Psych   Within defined limits           Cardiovascular    Hypertension: well controlled              Exercise tolerance: >4 METS     GI/Hepatic/Renal  Within defined limits              Endo/Other        Morbid obesity and arthritis     Other Findings   Comments:   Risk Factors for Postoperative nausea/vomiting:       History of postoperative nausea/vomiting? NO       Female? NO       Motion sickness? NO       Intended opioid administration for postoperative analgesia? YES      Smoking Abstinence  Current Smoker? NO  Elective Surgery? YES  Seen preoperatively by anesthesiologist or proxy prior to day of surgery? YES  Pt abstained from smoking 24 hours prior to anesthesia?  YES         Physical Exam    Airway  Mallampati: III  TM Distance: 4 - 6 cm  Neck ROM: normal range of motion   Mouth opening: Diminished (comment)     Cardiovascular    Rhythm: regular  Rate: normal         Dental  No notable dental hx       Pulmonary  Breath sounds clear to auscultation               Abdominal  GI exam deferred       Other Findings            Anesthetic Plan    ASA: 3  Anesthesia type: general and regional - popliteal fossa block          Induction: Intravenous  Anesthetic plan and risks discussed with: Patient

## 2017-12-21 ENCOUNTER — APPOINTMENT (OUTPATIENT)
Dept: GENERAL RADIOLOGY | Age: 42
DRG: 492 | End: 2017-12-21
Attending: PHYSICIAN ASSISTANT
Payer: OTHER GOVERNMENT

## 2017-12-21 LAB
ANION GAP SERPL CALC-SCNC: 7 MMOL/L (ref 3–18)
ARTERIAL PATENCY WRIST A: YES
BASE EXCESS BLD CALC-SCNC: 3 MMOL/L
BASOPHILS # BLD: 0 K/UL (ref 0–0.1)
BASOPHILS NFR BLD: 0 % (ref 0–2)
BDY SITE: ABNORMAL
BODY TEMPERATURE: 37
BUN SERPL-MCNC: 14 MG/DL (ref 7–18)
BUN/CREAT SERPL: 14 (ref 12–20)
CALCIUM SERPL-MCNC: 7.9 MG/DL (ref 8.5–10.1)
CHLORIDE SERPL-SCNC: 105 MMOL/L (ref 100–108)
CO2 SERPL-SCNC: 27 MMOL/L (ref 21–32)
CREAT SERPL-MCNC: 0.97 MG/DL (ref 0.6–1.3)
DIFFERENTIAL METHOD BLD: ABNORMAL
EOSINOPHIL # BLD: 0.1 K/UL (ref 0–0.4)
EOSINOPHIL NFR BLD: 2 % (ref 0–5)
ERYTHROCYTE [DISTWIDTH] IN BLOOD BY AUTOMATED COUNT: 14.8 % (ref 11.6–14.5)
GAS FLOW.O2 O2 DELIVERY SYS: ABNORMAL L/MIN
GAS FLOW.O2 SETTING OXYMISER: 16 BPM
GLUCOSE BLD STRIP.AUTO-MCNC: 112 MG/DL (ref 70–110)
GLUCOSE BLD STRIP.AUTO-MCNC: 93 MG/DL (ref 70–110)
GLUCOSE SERPL-MCNC: 87 MG/DL (ref 74–99)
HCO3 BLD-SCNC: 29.1 MMOL/L (ref 22–26)
HCT VFR BLD AUTO: 40.7 % (ref 36–48)
HGB BLD-MCNC: 12.7 G/DL (ref 13–16)
LYMPHOCYTES # BLD: 1.4 K/UL (ref 0.9–3.6)
LYMPHOCYTES NFR BLD: 21 % (ref 21–52)
MCH RBC QN AUTO: 26.8 PG (ref 24–34)
MCHC RBC AUTO-ENTMCNC: 31.2 G/DL (ref 31–37)
MCV RBC AUTO: 85.9 FL (ref 74–97)
MONOCYTES # BLD: 0.8 K/UL (ref 0.05–1.2)
MONOCYTES NFR BLD: 11 % (ref 3–10)
NEUTS SEG # BLD: 4.6 K/UL (ref 1.8–8)
NEUTS SEG NFR BLD: 66 % (ref 40–73)
O2/TOTAL GAS SETTING VFR VENT: 40 %
PCO2 BLD: 54.6 MMHG (ref 35–45)
PEEP RESPIRATORY: 5 CMH2O
PH BLD: 7.33 [PH] (ref 7.35–7.45)
PLATELET # BLD AUTO: 220 K/UL (ref 135–420)
PMV BLD AUTO: 10.3 FL (ref 9.2–11.8)
PO2 BLD: 88 MMHG (ref 80–100)
POTASSIUM SERPL-SCNC: 3.7 MMOL/L (ref 3.5–5.5)
RBC # BLD AUTO: 4.74 M/UL (ref 4.7–5.5)
SAO2 % BLD: 96 % (ref 92–97)
SERVICE CMNT-IMP: ABNORMAL
SODIUM SERPL-SCNC: 139 MMOL/L (ref 136–145)
SPECIMEN TYPE: ABNORMAL
TOTAL RESP. RATE, ITRR: 16
TROPONIN I SERPL-MCNC: 0.09 NG/ML (ref 0–0.04)
VENTILATION MODE VENT: ABNORMAL
VOLUME CONTROL PLUS IVLCP: YES
VT SETTING VENT: 500 ML
WBC # BLD AUTO: 7 K/UL (ref 4.6–13.2)

## 2017-12-21 PROCEDURE — 74011250636 HC RX REV CODE- 250/636: Performed by: PHYSICIAN ASSISTANT

## 2017-12-21 PROCEDURE — 74011250637 HC RX REV CODE- 250/637: Performed by: PHYSICIAN ASSISTANT

## 2017-12-21 PROCEDURE — 85025 COMPLETE CBC W/AUTO DIFF WBC: CPT

## 2017-12-21 PROCEDURE — 36415 COLL VENOUS BLD VENIPUNCTURE: CPT | Performed by: PHYSICIAN ASSISTANT

## 2017-12-21 PROCEDURE — 80048 BASIC METABOLIC PNL TOTAL CA: CPT

## 2017-12-21 PROCEDURE — 65610000006 HC RM INTENSIVE CARE

## 2017-12-21 PROCEDURE — C1751 CATH, INF, PER/CENT/MIDLINE: HCPCS

## 2017-12-21 PROCEDURE — 94003 VENT MGMT INPAT SUBQ DAY: CPT

## 2017-12-21 PROCEDURE — 71010 XR CHEST PORT: CPT

## 2017-12-21 PROCEDURE — 84484 ASSAY OF TROPONIN QUANT: CPT

## 2017-12-21 PROCEDURE — 74011250637 HC RX REV CODE- 250/637: Performed by: INTERNAL MEDICINE

## 2017-12-21 PROCEDURE — 87086 URINE CULTURE/COLONY COUNT: CPT | Performed by: PHYSICIAN ASSISTANT

## 2017-12-21 PROCEDURE — 36600 WITHDRAWAL OF ARTERIAL BLOOD: CPT

## 2017-12-21 PROCEDURE — 82803 BLOOD GASES ANY COMBINATION: CPT

## 2017-12-21 PROCEDURE — 74011000250 HC RX REV CODE- 250: Performed by: PHYSICIAN ASSISTANT

## 2017-12-21 PROCEDURE — 87040 BLOOD CULTURE FOR BACTERIA: CPT | Performed by: PHYSICIAN ASSISTANT

## 2017-12-21 PROCEDURE — 87077 CULTURE AEROBIC IDENTIFY: CPT | Performed by: PHYSICIAN ASSISTANT

## 2017-12-21 PROCEDURE — 74011250636 HC RX REV CODE- 250/636: Performed by: INTERNAL MEDICINE

## 2017-12-21 PROCEDURE — 87070 CULTURE OTHR SPECIMN AEROBIC: CPT | Performed by: PHYSICIAN ASSISTANT

## 2017-12-21 PROCEDURE — 87185 SC STD ENZYME DETCJ PER NZM: CPT | Performed by: PHYSICIAN ASSISTANT

## 2017-12-21 PROCEDURE — 82962 GLUCOSE BLOOD TEST: CPT

## 2017-12-21 RX ORDER — DEXTROSE 50 % IN WATER (D50W) INTRAVENOUS SYRINGE
25-50 AS NEEDED
Status: DISCONTINUED | OUTPATIENT
Start: 2017-12-21 | End: 2017-12-29 | Stop reason: HOSPADM

## 2017-12-21 RX ORDER — FUROSEMIDE 10 MG/ML
20 INJECTION INTRAMUSCULAR; INTRAVENOUS ONCE
Status: COMPLETED | OUTPATIENT
Start: 2017-12-21 | End: 2017-12-21

## 2017-12-21 RX ORDER — MAGNESIUM SULFATE 100 %
4 CRYSTALS MISCELLANEOUS AS NEEDED
Status: DISCONTINUED | OUTPATIENT
Start: 2017-12-21 | End: 2017-12-29 | Stop reason: HOSPADM

## 2017-12-21 RX ORDER — POTASSIUM CHLORIDE 1.5 G/1.77G
20 POWDER, FOR SOLUTION ORAL
Status: COMPLETED | OUTPATIENT
Start: 2017-12-21 | End: 2017-12-21

## 2017-12-21 RX ADMIN — FAMOTIDINE 20 MG: 10 INJECTION, SOLUTION INTRAVENOUS at 22:06

## 2017-12-21 RX ADMIN — Medication 5 MG/HR: at 16:29

## 2017-12-21 RX ADMIN — DOBUTAMINE IN DEXTROSE 1.5 MCG/KG/MIN: 200 INJECTION, SOLUTION INTRAVENOUS at 16:33

## 2017-12-21 RX ADMIN — Medication 180 MCG/HR: at 23:02

## 2017-12-21 RX ADMIN — CHLORHEXIDINE GLUCONATE 10 ML: 1.2 RINSE ORAL at 08:48

## 2017-12-21 RX ADMIN — Medication 180 MCG/HR: at 13:00

## 2017-12-21 RX ADMIN — PROPOFOL 30 MCG/KG/MIN: 10 INJECTION, EMULSION INTRAVENOUS at 00:00

## 2017-12-21 RX ADMIN — POTASSIUM CHLORIDE 20 MEQ: 1.5 POWDER, FOR SOLUTION ORAL at 04:43

## 2017-12-21 RX ADMIN — PROPOFOL 30 MCG/KG/MIN: 10 INJECTION, EMULSION INTRAVENOUS at 23:03

## 2017-12-21 RX ADMIN — PROPOFOL 30 MCG/KG/MIN: 10 INJECTION, EMULSION INTRAVENOUS at 19:43

## 2017-12-21 RX ADMIN — FAMOTIDINE 20 MG: 10 INJECTION, SOLUTION INTRAVENOUS at 08:48

## 2017-12-21 RX ADMIN — ENOXAPARIN SODIUM 40 MG: 40 INJECTION SUBCUTANEOUS at 09:08

## 2017-12-21 RX ADMIN — ACETAMINOPHEN 650 MG: 650 SOLUTION ORAL at 14:15

## 2017-12-21 RX ADMIN — PROPOFOL 30 MCG/KG/MIN: 10 INJECTION, EMULSION INTRAVENOUS at 14:15

## 2017-12-21 RX ADMIN — PROPOFOL 30 MCG/KG/MIN: 10 INJECTION, EMULSION INTRAVENOUS at 04:44

## 2017-12-21 RX ADMIN — ACETAMINOPHEN 650 MG: 650 SOLUTION ORAL at 19:14

## 2017-12-21 RX ADMIN — FUROSEMIDE 20 MG: 10 INJECTION, SOLUTION INTRAMUSCULAR; INTRAVENOUS at 16:33

## 2017-12-21 RX ADMIN — PROPOFOL 30 MCG/KG/MIN: 10 INJECTION, EMULSION INTRAVENOUS at 08:00

## 2017-12-21 RX ADMIN — CHLORHEXIDINE GLUCONATE 10 ML: 1.2 RINSE ORAL at 22:06

## 2017-12-21 NOTE — PROGRESS NOTES
[facility]      CARDIOLOGY PROGRESS NOTE  RECS:  1. Cardiac arrest with PEA, which was short, witnessed, and no evidence of hypoxia. Return of spontaneous circulation in 5 minutes or less after 3 doses of epinephrine. Likely secondary to underlying severe cardiomyopathy, which is likely nonischemic in this patient as he had a normal stress test in 2013. Supportive care to continue. PE should be ruled out also off pressors now. 2.  Cardiomyopathy, likely nonischemic. The patient had normal stress test in 08/2013, has never had a cardiac cath. 3.  Congestive heart failure, chronic, systolic. He seems to be in NYHA class 2. CXR with CHF- 1 dose of lasix today. F/u closely. Eventually, he may need to be evaluated by advanced heart failure team for LVAD or possible heart transplant in case his brain function returns to normal.  4.  Morbid obesity. Losing weight will be beneficial to reduce his future cardiac problems, especially congestive heart failure. Family understands it. ASSESSMENT:  Hospital Problems  Date Reviewed: 12/20/2017          Codes Class Noted POA    Ankle instability, left ICD-10-CM: M25.372  ICD-9-CM: 718.87  12/20/2017 Unknown        Bradycardia following surgery ICD-10-CM: I97.89  ICD-9-CM: 997.1  12/20/2017 Unknown                SUBJECTIVE:  Intubated, sedated      OBJECTIVE:    VS:   Visit Vitals    /69    Pulse (!) 114    Temp (!) 100.8 °F (38.2 °C)    Resp 16    Ht 5' 9\" (1.753 m)    Wt 344 lb 5.7 oz (156.2 kg)    SpO2 100%    BMI 50.85 kg/m2         Intake/Output Summary (Last 24 hours) at 12/21/17 1601  Last data filed at 12/21/17 1500   Gross per 24 hour   Intake          1597.61 ml   Output             2465 ml   Net          -867.39 ml     TELE: STach    General: Intubated, sedated    HENT: Normocephalic, atraumatic. Normal external eye.   Neck :  no bruit, JVD difficult to assess due to obesity  Cardiac:  regular rate and rhythm  Chest/Lungs:chest clear, no wheezing, rales, normal symmetric air entry on vent  Abdomen: Soft, nontender, no masses  Extremities:  No c/c/edema, peripheral pulses present      Labs: Results:       Chemistry Recent Labs      12/21/17   0220  12/20/17   1150   GLU  87  209*   NA  139  138   K  3.7  5.6*   CL  105  105   CO2  27  29   BUN  14  20*   CREA  0.97  1.53*   CA  7.9*  8.0*   MG   --   2.0   AGAP  7  4   BUCR  14  13      CBC w/Diff Recent Labs      12/21/17   0220  12/20/17   1150   WBC  7.0  9.1   RBC  4.74  5.16   HGB  12.7*  14.2   HCT  40.7  44.9   PLT  220  276   GRANS  66  65   LYMPH  21  23   EOS  2  2      Cardiac Enzymes Recent Labs      12/20/17   1150   CPK  239   CKND1  0.7      Coagulation No results for input(s): PTP, INR, APTT in the last 72 hours. No lab exists for component: INREXT    Lipid Panel No results found for: CHOL, CHOLPOCT, CHOLX, CHLST, CHOLV, 431555, HDL, LDL, LDLC, DLDLP, 174665, VLDLC, VLDL, TGLX, TRIGL, TRIGP, TGLPOCT, CHHD, CHHDX   BNP No results for input(s): BNPP in the last 72 hours. Liver Enzymes No results for input(s): TP, ALB, TBIL, AP, SGOT, GPT in the last 72 hours.     No lab exists for component: DBIL   Digoxin    Thyroid Studies No results found for: T4, T3U, TSH, TSHEXT           Emilia Shetty MD   Pager # [phone]

## 2017-12-21 NOTE — ADDENDUM NOTE
Addendum  created 12/21/17 1627 by Siena Sanchez CRNA    Anesthesia Event edited, Anesthesia Intra Flowsheets edited

## 2017-12-21 NOTE — PROGRESS NOTES
[facility]  Pulmonary, Critical Care, and Sleep Medicine    Name: Dylan Fischer MRN: [id number]   : 1975 Hospital: [address] [place] [address]   Date: 2017      I saw and evaluated the patient independently     IMPRESSION/ PLAN:     1. PEA arrest: ? Probably due to severe underlying cardiomyopathy at baseline; PE (low suspicion as clearly has weak LV on first look and RV looks fine plus ETCO2 was not reported to be low before arrest). Cont pressor support.     2. Severe diffuse CMO, likely non ischemic; ?? Viral myocarditis in the past??: once stable and extubated will need evaluation for LVAD/ Heart transplant. Cardiology on board. ASA, statin, add coreg once coming off pressors    3. B/l infiltrates- cardiogenic: stable on vent. PRN Lasix.     4. Mild TRENA- Oliguric ATN due to shock. Monitor for now. PRN lasix. May have to involve renal if Cr keeps rising and U.O keeps dropping.     5. Replace Lytes, keep K> 4.0, Mg > 2.0. Avoid meds which can prolong QtC. Daily EKGs     6. VAP bundle, Aspiration precautions, Sedation holiday, GI/ DVT PPx.    7. Start TFs.    8. Full Code.    [address] time spent > 50 mins in direct patient care. To Summarize:     39 yo AAM, morbidly obese, PASTORA with CPAP at home, HTN, was started on with elective sx- left ankle reconstructive Sx. For surgery patient had a TAD block and 2mg IV versed and 100 mcg IV fentanyl for intubation. About 15 mins into the procedure when patient was on his right side, he had bradycardia and PEA arrest. He has ROSC in 5 mins with 3 doses of 1 mg epi and high quality CPR. He was not noted to have any hypoxic event or drop in ETCO2 before the arrest. We were notified in sign out by CRNA that he had some ST depressions on tele.   After ROSC he was immediately transferred to MICU for further care. He is cardiogenic shock on Dobutamine. Post arrest EKG shows prolonged Qtc which was also present on EKG from 17.  No ST elevation or depressions or blocks have been noted. Set of trops are mildly elevated at 0.07 but CK, CKMB are negative. Stat Echo: LV looks severely hypokinetic 15-20% with diffuse hypokinesis. RV looks normal and moving well. Patient is moving limbs spontaneously and is agitated on the vent. Responds to commands         Subjective/Interval History:   O/n Patient was agitated, requiring very doses of sedation. On versed, propofol and fentanyl. Echo showed LV looks severely hypokinetic 15-20% with diffuse hypokinesis. RV looks normal and moving well. Noted cardilogy's recommendations.     U.O is low    ROS:Not possible, patient is sedated and intubated    Objective:   Vital Signs:    Visit Vitals    /59    Pulse (!) 106    Temp 100.3 °F (37.9 °C)    Resp 16    Ht 5' 9\" (1.753 m)    Wt 156.2 kg (344 lb 5.7 oz)    SpO2 100%    BMI 50.85 kg/m2       O2 Device: Endotracheal tube       Temp (24hrs), Av.4 °F (36.9 °C), Min:96.4 °F (35.8 °C), Max:100.3 °F (37.9 °C)       Intake/Output:   Last shift:         Last 3 shifts:  1901 -  0700  In: 1104 [I.V.:1104]  Out:  [Urine:]    Intake/Output Summary (Last 24 hours) at 17 1140  Last data filed at 17 1892   Gross per 24 hour   Intake             1104 ml   Output             2025 ml   Net             -921 ml        Physical Exam:    General: sedated and intubated   HEENT: PERRL   Chest:b/l crackles   Heart: S1 S2, no MRGs   Abdomen: S/NT/ND BS+               Extremity: warm, no pedal edema    Neuro: Intubated, sedated, spont moves lombs, agitated when lower on sedation        DATA:  Labs:  Recent Labs      17   0220  17   1150   WBC  7.0  9.1   HGB  12.7*  14.2   HCT  40.7  44.9   PLT  220  276     Recent Labs      17   0220  17   1150   NA  139  138   K  3.7  5.6*   CL  105  105   CO2  27  29   GLU  87  209*   BUN  14  20*   CREA  0.97  1.53*   CA  7.9*  8.0*   MG   --   2.0     Imaging:  [x]I have personally reviewed the patients radiographs  []Radiographs reviewed with radiologist   []No change from prior, tubes and lines in adequate position  []Improved   []Worsening    High complexity decision making was performed during the evaluation of this patient at high risk for decompensation with multiple organ involvement     Above mentioned total time spent on reviewing the case/medical record/data/notes/EMR/patient examination/documentation/coordinating care with nurse/consultants, exclusive of procedures with complex decision making performed and > 50% time spent in face to face evaluation.     Ethel Wilson MD

## 2017-12-21 NOTE — ROUTINE PROCESS
Received pt in bed. Pt calm  Mobility--bedrest  Respiratory-- vent setting ET A/C R 16  FIO2 40 Peep 5  Gu--Bustillos yellow cloudy with sediment  Skin--Left lower extremity  Ace wrap dressing  Drips--Fentanly 180 mcg, verse at [facility] ,profolol 30mcg    Bedside and Verbal shift change report given to [employer] (oncoming nurse) by Junior Sabra RN   (offgoing nurse). Report included the following information SBAR, Kardex, Intake/Output and MAR.

## 2017-12-21 NOTE — PROGRESS NOTES
PCCM Follow-up    Patient's wife ( from patient since 2011) -Rossy Jacinto - 22  - would like updates from team seeing pt.     Kim Montelongo PA-C

## 2017-12-21 NOTE — PROGRESS NOTES
attended the interdisciplinary rounds for [employer], who is a 40 y.o.,male. Patients Primary Language is: [place]. According to the patients EMR Quaker Affiliation is: No Anglican. The reason the Patient came to the hospital is:   Patient Active Problem List    Diagnosis Date Noted    Ankle instability, left 12/20/2017    Bradycardia following surgery 12/20/2017      Plan:  Miah Salazar will continue to follow and will provide pastoral care on an as needed/requested basis.  recommends bedside caregivers page  on duty if patient shows signs of acute spiritual or emotional distress.     [address] [facility]  Board Certified [facility]   [phone]

## 2017-12-21 NOTE — PROGRESS NOTES
NUTRITION    Nutrition Screen      RECOMMENDATIONS / PLAN:     - Start tube feeding of Vital High Protein at 20 mL/hr and advance as tolerated by 10 mL q 4 hours to 45 mL/hr with 100 mL q 4 hour water flushes and Prosource 4 times daily. Increase to goal rate of 60 mL/hr once propofol is stopped. - Continue RD inpatient monitoring and evaluation. Goal Regimen: Vital High Protein at 60 mL/hr + 100 mL q 4 hour water flushes to provide: 1440 kcal, 126 gm protein, 161 gm CHO, 0 gm fiber, 1204 mL free water, 1804 mL total water, 100% RDIs  Tube Feeding + Prosource 4 times daily to provide: 1680 kcal, 186 gm protein daily     NUTRITION INTERVENTIONS & DIAGNOSIS:     [x] Enteral nutrition support: initiate   [x] Coordination of nutrition care: interdisciplinary rounds, discussed nutrition plan above with MD    Nutrition Diagnosis: Inadequate oral intake related to inability to tolerate po due to respiratory status as evidenced by pt NPO while on the vent. ASSESSMENT:     Pt intubated s/p cardiac arrest during left ankle reconstructive surgery on 12/20. OG tube to suction, plan to start feeds per MD.     Average po intake adequate to meet patients estimated nutritional needs:   [] Yes     [x] No   [] Unable to determine at this time    Diet:   NPO     Food Allergies: NKFA  Current Appetite:   [] Good     [] Fair     [] Poor     [x] Other: NPO  Appetite/meal intake prior to admission:   [] Good     [] Fair     [] Poor     [x] Other: unknown   Feeding Limitations:  [] Swallowing difficulty    [] Chewing difficulty    [] Other:  Current Meal Intake: No data found.     BM: 12/20  Skin Integrity: ankle wound  Edema: none   Pertinent Medications: Reviewed: propofol at 30 mcg/kg/min (729 kcal per day), KCl    Recent Labs      12/21/17   0220  12/20/17   1150   NA  139  138   K  3.7  5.6*   CL  105  105   CO2  27  29   GLU  87  209*   BUN  14  20*   CREA  0.97  1.53*   CA  7.9*  8.0*   MG   --   2.0       Intake/Output discharge planning, & interdisciplinary rounds as appropriate      Raysa Scale, [address] [place] [address], 1970 [place]    Pager: [phone]

## 2017-12-21 NOTE — PROGRESS NOTES
Care Management Interventions  Transition of Care Consult (CM Consult): Discharge Planning (TBD)  Current Support Network: Other (Pt lives with his fiancee. Patient is still legally  to first wife. No AMD on file. )  Plan discussed with Pt/Family/Caregiver: Yes (Pt's Ariela Locke [phone], at bedside)    Patient is currently intubated and sedated. His fiancee, Wyattan , and her best friend are at bedside. Patient is still  to his \"ex-wife\" but has been legally  since 2011. There is no AMD on file. Patient in for elective ankle surgery with workman's comp. Suffered cardiac arrest while in surgery.

## 2017-12-21 NOTE — ROUTINE PROCESS
Bedside and Verbal shift change report given to Nurse Aurelio Johnson RN (oncoming nurse) by Elham Casey RN (offgoing nurse). Report included the following information SBAR, Kardex, MAR and Recent Results.     SITUATION:    Code Status: Full Code   Reason for Admission: S93.402D ANKLE SPRAIN   Ankle instability, left   Bradycardia following surgery    [facility] day: 0   Problem List:       Hospital Problems  Date Reviewed: 12/20/2017          Codes Class Noted POA    Ankle instability, left ICD-10-CM: M25.372  ICD-9-CM: 718.87  12/20/2017 Unknown        Bradycardia following surgery ICD-10-CM: I97.89  ICD-9-CM: 997.1  12/20/2017 Unknown              BACKGROUND:    Past Medical History:   Past Medical History:   Diagnosis Date    Bronchitis     Hypertension     Sleep apnea     on cpap         Patient taking anticoagulants yes on scheduled lovenox    ASSESSMENT:   Changes in Assessment Throughout Shift: placed cvl, jiménez started on vasopressors,    Patient has Central Line: yes Reasons if yes: hemodynamically unstable, on multiple vasopressors and sedations    Patient has Jiménez Cath: yes Reasons if yes: strict I and O      Last Vitals:     Vitals:    12/20/17 2025 12/20/17 2030 12/20/17 2035 12/20/17 2040   BP: 121/64 119/66 119/65 119/68   Pulse: 92 90 90 91   Resp: 16 16 16 16   Temp:       SpO2: 100% 100% 100% 100%   Weight:       Height:            IV and DRAINS (will only show if present)   Peripheral IV 12/20/17 Right Hand-Site Assessment: Clean, dry, & intact  Triple Lumen left cvp 12/20/17 Left Internal jugular-Site Assessment: Clean, dry, & intact  Airway - Endotracheal Tube 12/20/17 Oral-Site Assessment: Clean, dry, & intact     WOUND (if present)   Wound Type:  Left leg torn ligament unfinished repair with ace wrap  Dressing in placed   Dressing present Dressing Present : No (unstable to turn)   Wound Concerns/Notes:  Frequent skin check and left leg monitoring     PAIN    Pain Assessment    Pain Intensity 1: 0 (12/20/17 1000)              Patient Stated Pain Goal: Unable to verbalize/indicatate pain  o Interventions for Pain:  On sedation and pain drip titrate as needed  o Intervention effective: yes  o Time of last intervention: on continuous drip   o Reassessment Completed: yes      Last 3 Weights:  Last 3 Recorded Weights in this Encounter    12/14/17 0922 12/20/17 0628   Weight: 132 kg (291 lb) 153.3 kg (338 lb)     Weight change:      INTAKE/OUPUT    Current Shift:      Last three shifts: 12/19 0701 - 12/20 1900  In: 419.1 [I.V.:419.1]  Out: 1150 [Urine:1150]     LAB RESULTS     Recent Labs      12/20/17   1150   WBC  9.1   HGB  14.2   HCT  44.9   PLT  276        Recent Labs      12/20/17   1150   NA  138   K  5.6*   GLU  209*   BUN  20*   CREA  1.53*   CA  8.0*   MG  2.0       RECOMMENDATIONS AND DISCHARGE PLANNING     1. Pending tests/procedures/ Plan of Care or Other Needs: cardiologist consulted, on scheduled lab ABG as ordered this pm, monitor oxygenation    2. Discharge plan for patient and Needs/Barriers: none    3. Estimated Discharge Date: TBD Posted on Whiteboard in Patients Room: yes      4. The patient's care plan was reviewed with the oncoming nurse. \"HEALS\" SAFETY CHECK      Fall Risk    Total Score: 3    Safety Measures:      A safety check occurred in the patient's room between off going nurse and oncoming nurse listed above.     The safety check included the below items  Area Items   H  High Alert Medications - Verify all high alert medication drips (heparin, PCA, etc.)   E  Equipment - Suction is set up for ALL patients (with yanker)  - Red plugs utilized for all equipment (IV pumps, etc.)  - WOWs wiped down at end of shift.  - Room stocked with oxygen, suction, and other unit-specific supplies   A  Alarms - Bed alarm is set for fall risk patients  - Ensure chair alarm is in place and activated if patient is up in a chair   L  Lines - Check IV for any infiltration  - Bustillos bag is empty if patient has a Bustillos   - Tubing and IV bags are labeled   S  Safety   - Room is clean, patient is clean, and equipment is clean. - Hallways are clear from equipment besides carts. - Fall bracelet on for fall risk patients  - Ensure room is clear and free of clutter  - Suction is set up for ALL patients (with yanker)  - Hallways are clear from equipment besides carts.    - Isolation precautions followed, supplies available outside room, sign posted     Chivo Yung RN

## 2017-12-21 NOTE — ANESTHESIA POSTPROCEDURE EVALUATION
Post-Anesthesia Evaluation and Assessment    Patient: Tommy Gottlieb MRN: [id number]  SSN: [id number]    YOB: 1975  Age: 40 y.o. Sex: male       Cardiovascular Function/Vital Signs  Visit Vitals    /69    Pulse (!) 110    Temp (!) 38.2 °C (100.8 °F)    Resp 16    Ht 5' 9\" (1.753 m)    Wt 156.2 kg (344 lb 5.7 oz)    SpO2 100%    BMI 50.85 kg/m2       Patient is status post general, regional anesthesia for Procedure(s):  LEFT ANKLE RECONSTRUCTION SECONDARY DISRUPTED COLLATERAL ANKLE LEGAMENT; procedure not performed; cpr given. Neurological Status:   Intubated and sedated    Mental Status and Level of Consciousness: Sedated    Pulmonary Status:   O2 Device: Endotracheal tube;Ventilator (12/21/17 5229)       Complications related to anesthesia: patient intubated and sedated following intraoperative cardiac arrest. Intensivest and Cardiology following. Post-anesthesia assessment completed.      Signed By: Ramón Wasserman CRNA     December 21, 2017

## 2017-12-21 NOTE — CDMP QUERY
Please clarify type of shock noted in progress notes requiring pressors. \"    Mild TRENA- ATN due to shock.  \"      Thank you,   Tom Loera RN   CCDS    x [phone]

## 2017-12-22 ENCOUNTER — APPOINTMENT (OUTPATIENT)
Dept: GENERAL RADIOLOGY | Age: 42
DRG: 492 | End: 2017-12-22
Attending: PHYSICIAN ASSISTANT
Payer: OTHER GOVERNMENT

## 2017-12-22 LAB
ANION GAP SERPL CALC-SCNC: 6 MMOL/L (ref 3–18)
ARTERIAL PATENCY WRIST A: ABNORMAL
ARTERIAL PATENCY WRIST A: YES
BASE EXCESS BLD CALC-SCNC: 3 MMOL/L
BASE EXCESS BLD CALC-SCNC: 5 MMOL/L
BASOPHILS # BLD: 0 K/UL (ref 0–0.1)
BASOPHILS NFR BLD: 0 % (ref 0–2)
BDY SITE: ABNORMAL
BDY SITE: ABNORMAL
BUN SERPL-MCNC: 12 MG/DL (ref 7–18)
BUN/CREAT SERPL: 10 (ref 12–20)
CALCIUM SERPL-MCNC: 8 MG/DL (ref 8.5–10.1)
CHLORIDE SERPL-SCNC: 101 MMOL/L (ref 100–108)
CO2 SERPL-SCNC: 30 MMOL/L (ref 21–32)
CREAT SERPL-MCNC: 1.23 MG/DL (ref 0.6–1.3)
DIFFERENTIAL METHOD BLD: ABNORMAL
EOSINOPHIL # BLD: 0.5 K/UL (ref 0–0.4)
EOSINOPHIL NFR BLD: 6 % (ref 0–5)
ERYTHROCYTE [DISTWIDTH] IN BLOOD BY AUTOMATED COUNT: 14.9 % (ref 11.6–14.5)
GAS FLOW.O2 O2 DELIVERY SYS: ABNORMAL L/MIN
GAS FLOW.O2 O2 DELIVERY SYS: ABNORMAL L/MIN
GAS FLOW.O2 SETTING OXYMISER: 16 BPM
GAS FLOW.O2 SETTING OXYMISER: 18 BPM
GLUCOSE BLD STRIP.AUTO-MCNC: 102 MG/DL (ref 70–110)
GLUCOSE BLD STRIP.AUTO-MCNC: 104 MG/DL (ref 70–110)
GLUCOSE BLD STRIP.AUTO-MCNC: 136 MG/DL (ref 70–110)
GLUCOSE SERPL-MCNC: 98 MG/DL (ref 74–99)
HCO3 BLD-SCNC: 29.6 MMOL/L (ref 22–26)
HCO3 BLD-SCNC: 30.9 MMOL/L (ref 22–26)
HCT VFR BLD AUTO: 42.4 % (ref 36–48)
HGB BLD-MCNC: 13.2 G/DL (ref 13–16)
INSPIRATION.DURATION SETTING TIME VENT: 0.9 SEC
INSPIRATION.DURATION SETTING TIME VENT: 0.9 SEC
LYMPHOCYTES # BLD: 2.4 K/UL (ref 0.9–3.6)
LYMPHOCYTES NFR BLD: 32 % (ref 21–52)
MAGNESIUM SERPL-MCNC: 2.1 MG/DL (ref 1.6–2.6)
MCH RBC QN AUTO: 27.3 PG (ref 24–34)
MCHC RBC AUTO-ENTMCNC: 31.1 G/DL (ref 31–37)
MCV RBC AUTO: 87.8 FL (ref 74–97)
MONOCYTES # BLD: 0.9 K/UL (ref 0.05–1.2)
MONOCYTES NFR BLD: 12 % (ref 3–10)
NEUTS SEG # BLD: 3.7 K/UL (ref 1.8–8)
NEUTS SEG NFR BLD: 50 % (ref 40–73)
O2/TOTAL GAS SETTING VFR VENT: 0.4 %
O2/TOTAL GAS SETTING VFR VENT: 40 %
PCO2 BLD: 56.5 MMHG (ref 35–45)
PCO2 BLD: 58.6 MMHG (ref 35–45)
PEEP RESPIRATORY: 5 CMH2O
PEEP RESPIRATORY: 5 CMH2O
PH BLD: 7.31 [PH] (ref 7.35–7.45)
PH BLD: 7.35 [PH] (ref 7.35–7.45)
PIP ISTAT,IPIP: 28
PLATELET # BLD AUTO: 222 K/UL (ref 135–420)
PMV BLD AUTO: 10.3 FL (ref 9.2–11.8)
PO2 BLD: 87 MMHG (ref 80–100)
PO2 BLD: 89 MMHG (ref 80–100)
POTASSIUM SERPL-SCNC: 4 MMOL/L (ref 3.5–5.5)
RBC # BLD AUTO: 4.83 M/UL (ref 4.7–5.5)
SAO2 % BLD: 96 % (ref 92–97)
SAO2 % BLD: 96 % (ref 92–97)
SERVICE CMNT-IMP: ABNORMAL
SERVICE CMNT-IMP: ABNORMAL
SODIUM SERPL-SCNC: 137 MMOL/L (ref 136–145)
SPECIMEN TYPE: ABNORMAL
SPECIMEN TYPE: ABNORMAL
TOTAL RESP. RATE, ITRR: 16
TOTAL RESP. RATE, ITRR: 20
TROPONIN I SERPL-MCNC: 0.05 NG/ML (ref 0–0.04)
VENTILATION MODE VENT: ABNORMAL
VENTILATION MODE VENT: ABNORMAL
VOLUME CONTROL PLUS IVLCP: YES
VOLUME CONTROL PLUS IVLCP: YES
VT SETTING VENT: 500 ML
VT SETTING VENT: 500 ML
WBC # BLD AUTO: 7.4 K/UL (ref 4.6–13.2)

## 2017-12-22 PROCEDURE — 74011250636 HC RX REV CODE- 250/636: Performed by: PHYSICIAN ASSISTANT

## 2017-12-22 PROCEDURE — 74011000250 HC RX REV CODE- 250

## 2017-12-22 PROCEDURE — 71010 XR CHEST PORT: CPT

## 2017-12-22 PROCEDURE — 87070 CULTURE OTHR SPECIMN AEROBIC: CPT | Performed by: PHYSICIAN ASSISTANT

## 2017-12-22 PROCEDURE — 82803 BLOOD GASES ANY COMBINATION: CPT

## 2017-12-22 PROCEDURE — 74011250637 HC RX REV CODE- 250/637: Performed by: INTERNAL MEDICINE

## 2017-12-22 PROCEDURE — 83735 ASSAY OF MAGNESIUM: CPT | Performed by: INTERNAL MEDICINE

## 2017-12-22 PROCEDURE — 94003 VENT MGMT INPAT SUBQ DAY: CPT

## 2017-12-22 PROCEDURE — 74011000250 HC RX REV CODE- 250: Performed by: INTERNAL MEDICINE

## 2017-12-22 PROCEDURE — 36600 WITHDRAWAL OF ARTERIAL BLOOD: CPT

## 2017-12-22 PROCEDURE — 74011250637 HC RX REV CODE- 250/637: Performed by: PHYSICIAN ASSISTANT

## 2017-12-22 PROCEDURE — 65610000006 HC RM INTENSIVE CARE

## 2017-12-22 PROCEDURE — 80048 BASIC METABOLIC PNL TOTAL CA: CPT

## 2017-12-22 PROCEDURE — 74011000250 HC RX REV CODE- 250: Performed by: PHYSICIAN ASSISTANT

## 2017-12-22 PROCEDURE — 84484 ASSAY OF TROPONIN QUANT: CPT | Performed by: INTERNAL MEDICINE

## 2017-12-22 PROCEDURE — 85025 COMPLETE CBC W/AUTO DIFF WBC: CPT

## 2017-12-22 PROCEDURE — 82962 GLUCOSE BLOOD TEST: CPT

## 2017-12-22 RX ORDER — SODIUM CHLORIDE 9 MG/ML
INJECTION INTRAMUSCULAR; INTRAVENOUS; SUBCUTANEOUS
Status: COMPLETED
Start: 2017-12-22 | End: 2017-12-22

## 2017-12-22 RX ORDER — FUROSEMIDE 10 MG/ML
20 INJECTION INTRAMUSCULAR; INTRAVENOUS ONCE
Status: COMPLETED | OUTPATIENT
Start: 2017-12-22 | End: 2017-12-22

## 2017-12-22 RX ORDER — FUROSEMIDE 10 MG/ML
20 INJECTION INTRAMUSCULAR; INTRAVENOUS 2 TIMES DAILY
Status: DISCONTINUED | OUTPATIENT
Start: 2017-12-23 | End: 2017-12-24

## 2017-12-22 RX ORDER — ENALAPRILAT 1.25 MG/ML
1.25 INJECTION INTRAVENOUS EVERY 6 HOURS
Status: DISCONTINUED | OUTPATIENT
Start: 2017-12-23 | End: 2017-12-24

## 2017-12-22 RX ORDER — METOPROLOL TARTRATE 5 MG/5ML
2.5 INJECTION INTRAVENOUS EVERY 6 HOURS
Status: DISCONTINUED | OUTPATIENT
Start: 2017-12-23 | End: 2017-12-23

## 2017-12-22 RX ADMIN — Medication 180 MCG/HR: at 23:04

## 2017-12-22 RX ADMIN — FUROSEMIDE 20 MG: 10 INJECTION, SOLUTION INTRAMUSCULAR; INTRAVENOUS at 18:28

## 2017-12-22 RX ADMIN — Medication 180 MCG/HR: at 11:14

## 2017-12-22 RX ADMIN — METOPROLOL TARTRATE 2.5 MG: 5 INJECTION, SOLUTION INTRAVENOUS at 23:06

## 2017-12-22 RX ADMIN — ACETAMINOPHEN 650 MG: 650 SOLUTION ORAL at 11:11

## 2017-12-22 RX ADMIN — PROPOFOL 30 MCG/KG/MIN: 10 INJECTION, EMULSION INTRAVENOUS at 03:04

## 2017-12-22 RX ADMIN — SODIUM CHLORIDE 10 ML: 9 INJECTION INTRAMUSCULAR; INTRAVENOUS; SUBCUTANEOUS at 10:58

## 2017-12-22 RX ADMIN — CHLORHEXIDINE GLUCONATE 10 ML: 1.2 RINSE ORAL at 10:59

## 2017-12-22 RX ADMIN — CHLORHEXIDINE GLUCONATE 10 ML: 1.2 RINSE ORAL at 21:37

## 2017-12-22 RX ADMIN — Medication 2 MG/HR: at 23:04

## 2017-12-22 RX ADMIN — FAMOTIDINE 20 MG: 10 INJECTION, SOLUTION INTRAVENOUS at 10:58

## 2017-12-22 RX ADMIN — PROPOFOL 30 MCG/KG/MIN: 10 INJECTION, EMULSION INTRAVENOUS at 06:46

## 2017-12-22 RX ADMIN — PROPOFOL 20 MCG/KG/MIN: 10 INJECTION, EMULSION INTRAVENOUS at 18:20

## 2017-12-22 RX ADMIN — PROPOFOL 30 MCG/KG/MIN: 10 INJECTION, EMULSION INTRAVENOUS at 21:37

## 2017-12-22 RX ADMIN — ENOXAPARIN SODIUM 40 MG: 40 INJECTION SUBCUTANEOUS at 10:59

## 2017-12-22 RX ADMIN — FAMOTIDINE 20 MG: 10 INJECTION, SOLUTION INTRAVENOUS at 21:37

## 2017-12-22 RX ADMIN — PROPOFOL 20 MCG/KG/MIN: 10 INJECTION, EMULSION INTRAVENOUS at 12:04

## 2017-12-22 RX ADMIN — ENALAPRILAT 1.25 MG: 1.25 INJECTION, SOLUTION INTRAVENOUS at 23:33

## 2017-12-22 NOTE — PROGRESS NOTES
Discussed patient care with patient's wife (seperated from patience since 2011), but not legally  - Chela Cohn - updated on patient status, she gave permission to discuss case with patient's girlfriend who is currently at bedside. Any decisions that need to be made, Ms. Shara Mederos requests that all of his family be included.      12:23 PM  Scott Jacobson PA-C

## 2017-12-22 NOTE — PROGRESS NOTES
12/22/17 0514   Vent Settings   CMV Rate Set 18   Back-Up Rate 18     Set rate increased post ABG results, PCO2 58.6.

## 2017-12-22 NOTE — ROUTINE PROCESS
Fentanyl drip was decreased to 150 mcg. After 15- 20 mins patient arousing and pulling at restraints and bucking machine. Fentanyl increased to 170 m,cg

## 2017-12-22 NOTE — PROGRESS NOTES
Problem: Hypertension  Goal: *Blood pressure within specified parameters  Outcome: Progressing Towards Goal  Family updated on the importance of monitoring blood pressure at home and adhering to medical plan

## 2017-12-22 NOTE — PROGRESS NOTES
attended the interdisciplinary rounds for Family Dolljorge Hough, who is a 40 y.o.,male. Patients Primary Language is: [place]. According to the patients EMR Adventist Affiliation is: No Buddhist. The reason the Patient came to the hospital is:   Patient Active Problem List    Diagnosis Date Noted    Ankle instability, left 12/20/2017    Bradycardia following surgery 12/20/2017      Plan:  Uli Etienne will continue to follow and will provide pastoral care on an as needed/requested basis.  recommends bedside caregivers page  on duty if patient shows signs of acute spiritual or emotional distress.     [address] [facility]  Board Certified [address] [facility]   [phone]

## 2017-12-22 NOTE — PROGRESS NOTES
[facility]  ICU Progress Note      Name: Purvi Lacy   : 1975   MRN: [id number]   Date: 2017 9:07 AM     [x]I have reviewed the flowsheet and previous days notes. Events overnight reviewed and discussed with nursing staff. Vital signs and records reviewed. Subjective: 41 yo AAM, morbidly obese, PASTORA with CPAP at home, HTN, was started on with elective sx- left ankle reconstructive Sx. For surgery patient had a TAD block and 2mg IV versed and 100 mcg IV fentanyl for intubation. About 15 mins into the procedure when patient was on his right side, he had bradycardia and PEA arrest. He has ROSC in 5 mins with 3 doses of 1 mg epi and high quality CPR. He was not noted to have any hypoxic event or drop in ETCO2 before the arrest. We were notified in sign out by CRNA that he had some ST depressions on tele.     After ROSC he was immediately transferred to MICU for further care. He is cardiogenic shock on Dobutamine. Post arrest EKG shows prolonged Qtc which was also present on EKG from 17. No ST elevation or depressions or blocks have been noted. Echo shows severely thickened left ventricle and EF 15-20%    Patient sedation was decreased last night, became agitated, but able to be verbally redirected, not able to follow commands, off levophed, dobutamine decreased    [x]The patient is unable to give any meaningful history or review of systems because the patient is:  [x]Intubated []Sedated   []Unresponsive      [x]The patient is critically ill on      [x]Mechanical ventilation [x]Pressors   []BiPAP []                 ROS:Pertinent items are noted in HPI.     Medication Review:  · Pressors - Dobutamine  · Sedation -Versed/Fentanyl/Propofol  · Antibiotics - none  · Pain - Fentanyl  · GI/ DVT - Pepcid/Lovenox    Safety Bundles: VAP Bundle/ Electrolyte Replacement Protocol    Vital Signs:    Visit Vitals    /59    Pulse (!) 106    Temp (!) 100.6 °F (38.1 °C)  Resp 9    Ht 5' 9\" (1.753 m)    Wt (!) 160.2 kg (353 lb 2.8 oz)    SpO2 98%    BMI 52.16 kg/m2       O2 Device: Endotracheal tube, Ventilator       Temp (24hrs), Av.7 °F (38.2 °C), Min:99.8 °F (37.7 °C), Max:101.6 °F (38.7 °C)       Intake/Output:   Last shift:      701 - 1900  In: 607.6 [I.V.:187.6]  Out: 105 [Urine:105]  Last 3 shifts: 1901 -  07  In: 3178.6 [I.V.:1968.6]  Out: 2960 [Urine:2960]    Intake/Output Summary (Last 24 hours) at 17 1231  Last data filed at 17 1120   Gross per 24 hour   Intake          2975.03 ml   Output             1950 ml   Net          1025.03 ml       Ventilator Settings:  Ventilator Mode: Assist control, VC+  Respiratory Rate  Back-Up Rate: 18  Insp Time (sec): 0.9 sec  I:E Ratio: 1:2.7  Ventilator Volumes  Vt Set (ml): 500 ml  Vt Exhaled (Machine Breath) (ml): 512 ml  Ve Observed (l/min): 9.2 l/min  Ventilator Pressures  PIP Observed (cm H2O): 26 cm H2O  Plateau Pressure (cm H2O): 21 cm H2O  MAP (cm H2O): 10  PEEP/VENT (cm H2O): 5 cm H20  Auto PEEP Observed (cm H2O): 5 cm H2O    Physical Exam:    General: Intubated/sedated; not following commands  HEENT:  Anicteric sclerae; pink palpebral conjunctivae; mucosa moist  Resp:  Symmetrical chest expansion, no accessory muscle use; slightly decreased breath sounds on right anteriorly   CV:  S1, S2 present; regular rate and rhythm  GI:  Abdomen soft, non-tender; (+) active bowel sounds  Extremities:  +2 pulses on all extremities; left lower extremity posterior splint in place  Skin:  Warm; no rashes/ lesions noted  Neurologic:  Non-focal, moves all four extremities, responds to painful stimuli   Devices:  ETT/OGT, Left IJ CVL is C/D/I      DATA:     Current Facility-Administered Medications   Medication Dose Route Frequency    influenza vaccine  (3 yrs+)(PF) (FLUZONE QUAD/FLUARIX QUAD) injection 0.5 mL  0.5 mL IntraMUSCular PRIOR TO DISCHARGE    glucose chewable tablet 16 g  4 Tab Oral PRN    glucagon (GLUCAGEN) injection 1 mg  1 mg IntraMUSCular PRN    dextrose (D50W) injection syrg 12.5-25 g  25-50 mL IntraVENous PRN    acetaminophen (TYLENOL) solution 650 mg  650 mg Per NG tube Q4H PRN    chlorhexidine (PERIDEX) 0.12 % mouthwash 10 mL  10 mL Oral Q12H    enoxaparin (LOVENOX) injection 40 mg  40 mg SubCUTAneous Q24H    midazolam (VERSED) injection 2 mg  2 mg IntraVENous Q10MIN PRN    fentaNYL (PF) 10 mcg/mL infusion  0-200 mcg/hr IntraVENous TITRATE    midazolam in normal saline (VERSED) 1 mg/mL infusion  0-10 mg/hr IntraVENous TITRATE    albuterol-ipratropium (DUO-NEB) 2.5 MG-0.5 MG/3 ML  3 mL Nebulization Q6H PRN    famotidine (PF) (PEPCID) injection 20 mg  20 mg IntraVENous Q12H    propofol (DIPRIVAN) infusion  0-50 mcg/kg/min IntraVENous TITRATE    DOBUTamine (DOBUTREX) 500 mg/250 mL (2,000 mcg/mL) infusion  2.5 mcg/kg/min IntraVENous CONTINUOUS         Labs: Results:       Chemistry Recent Labs      12/22/17   0400  12/21/17   0220  12/20/17   1150   GLU  98  87  209*   NA  137  139  138   K  4.0  3.7  5.6*   CL  101  105  105   CO2  30  27  29   BUN  12  14  20*   CREA  1.23  0.97  1.53*   CA  8.0*  7.9*  8.0*   AGAP  6  7  4   BUCR  10*  14  13      CBC w/Diff Recent Labs      12/22/17   0400  12/21/17   0220  12/20/17   1150   WBC  7.4  7.0  9.1   RBC  4.83  4.74  5.16   HGB  13.2  12.7*  14.2   HCT  42.4  40.7  44.9   PLT  222  220  276   GRANS  50  66  65   LYMPH  32  21  23   EOS  6*  2  2      Coagulation No results for input(s): PTP, INR, APTT in the last 72 hours. No lab exists for component: INREXT, INREXT    Liver Enzymes No results for input(s): TP, ALB, TBIL, AP, SGOT, GPT in the last 72 hours.     No lab exists for component: DBIL   ABG Lab Results   Component Value Date/Time    PHI 7.312 (L) 12/22/2017 05:11 AM    PCO2I 58.6 (H) 12/22/2017 05:11 AM    PO2I 89 12/22/2017 05:11 AM    HCO3I 29.6 (H) 12/22/2017 05:11 AM    FIO2I 0.40 12/22/2017 05:11 AM Microbiology Recent Labs      12/21/17 2026 12/21/17   1658  12/21/17   1650   CULT  NO GROWTH AFTER 13 HOURS  NO GROWTH AFTER 16 HOURS  PENDING          Telemetry: [x]Sinus []A-flutter []Paced    []A-fib []Multiple PVCs                    Imaging:  [x]I have personally reviewed the patients radiographs  []Radiographs reviewed with radiologist   []No change from prior, tubes and lines in adequate position  []Improved   []Worsening        IMPRESSION:   · PEA Arrest- likely d/t severe underlying cardiomyopathy and reduced EF, low suspicion for pulmonary embolism   · Cardiomyopathy with systolic congestive heart failure- echo on 12/20 with diffuse severe global dysfunction and EF 15-20%. According to previous records from [facility] in 2013 nuclear stress with EF 20% no ischemia, likely secondary to hypertensive non ischemic cardiomyopathy. Previously on Carvedilol, Spironolactone, Lisinopril, Furosemide. No previous cardiac cath. · Mild Acute Kidney Injury- improving, good UOP yesterday, approximately 2 Liters  · Sleep apnea  · Hypertension- hold meds at this time given patient is still on pressors  · Left ankle reconstructive surgery- partial surgery completed, posterior splint applied by ortho in OR      PLAN:   · Resp -  VAP bundle, aspiration precautions. ABG 7.31/58/89/29, increase respiratory rate and repeat ABG in 6 hours. Switch ETT tube because does not have OLGA tube. · ID - fever yesterday of 101.6, blood cultures NGTD, urine culture pending, sputum culture pending. CXR with low lung volumes, hazy retrocardiac space (has not been on vent over 48 hours). WBC 7.4. Discussed with Dr. Elfego Christine) will come today to inspect left ankle incision. No obvious source. Will re culture if spikes fever ago. Tylenol PRN. Hold abx at this time. · CVS - Dobutamine @ 2.5, will titrate for MAP>65. Diuresed yesterday with good result. Lasix PRN.  Follow up cardiology recs, will possible need transfer to advanced heart failure center at [facility]  · Heme/onc -   Stable H&H, monitor for any signs of bleeding, daily CBC  · Metabolic - replace electrolytes per protocol. K+ replaced this morning. Avoid any agents which can prolong QTC. Daily EKG  · Renal - Cr improving, 2000 ml urine output since yesterday, monitor daily BMP   · Endocrine - blood sugars stable, continue to monitor  · Neuro/ Pain/ Sedation - currently on Fentanyl/Versed/Propofol. Goal is to come off versed today and continue Propofol with lower dose Fentanyl gtt. Consider Precedex   · GI - no acute issues, continue tube feeds per nutritions recs  · MSK -  Posterior splint to LLE, ortho will come today to evaluate ankle. · Prophylaxis - DVT(Lovenox), GI(pepcid)  · Discussed in interdisciplinary rounds        Patient and ex-wife Marvin Morton) , but legally still  would like updates on the patient. Discussed with risk management, need clearance from Ms. Mark Wong to discuss patient's care with girlfriend. Will determine if medical POA was transferred.      The patient is: [] acutely ill Risk of deterioration: [] moderate    [x] critically ill  [x] high     [x]See my orders for details    My assessment/plan was discussed with:  []nursing []PT/OT    [x]respiratory therapy [x]Dr. Teena Ozuna   [x]family []       BHAVESH Finney

## 2017-12-22 NOTE — PROGRESS NOTES
1945- Beside report received from offgoing nurse [facility] RN. Drip verifications done as well and documented in [facility] with offgoing nurse. Patient turned and repostioned at this time as well. 2020- Assessment completed. Family at bedside, patient is sedated and HOB elevated at 30 degrees. Oral care preformed. 2200- patient turned and repositioned. Tolerated well. Think brown secretions from ETT. Oral care given. 0000- Turned and repositioned. No noted distress. Titration  down on sedation. Versed dropped. 0200- Bathed are repositioned. Sedation held and patient was nodding head appropriately. Opens eyes with tactile stimulation. 0400- Resting no noted distress. 0660- Bedside shift change report given to [place] (oncoming nurse) by Meaghan Ch (offgoing nurse). Report included the following information Kardex and Recent Results.

## 2017-12-22 NOTE — ROUTINE PROCESS
Bedside shift change report given to Soto Burden (oncoming nurse) by Ngoc Fulton (offgoing nurse). Report included the following information SBAR, Kardex, MAR and Recent Results.

## 2017-12-23 ENCOUNTER — APPOINTMENT (OUTPATIENT)
Dept: GENERAL RADIOLOGY | Age: 42
DRG: 492 | End: 2017-12-23
Attending: PHYSICIAN ASSISTANT
Payer: OTHER GOVERNMENT

## 2017-12-23 PROBLEM — I50.22 SYSTOLIC CHF, CHRONIC (HCC): Status: ACTIVE | Noted: 2017-12-23

## 2017-12-23 PROBLEM — I46.9 CARDIAC ARREST WITH PULSELESS ELECTRICAL ACTIVITY (HCC): Status: ACTIVE | Noted: 2017-12-23

## 2017-12-23 PROBLEM — R00.0 TACHYCARDIA: Status: ACTIVE | Noted: 2017-12-23

## 2017-12-23 PROBLEM — N17.9 ACUTE KIDNEY INJURY (HCC): Status: ACTIVE | Noted: 2017-12-23

## 2017-12-23 PROBLEM — I42.9 CARDIOMYOPATHY (HCC): Status: ACTIVE | Noted: 2017-12-23

## 2017-12-23 PROBLEM — I10 HYPERTENSION: Status: ACTIVE | Noted: 2017-12-23

## 2017-12-23 LAB
ANION GAP SERPL CALC-SCNC: 7 MMOL/L (ref 3–18)
ARTERIAL PATENCY WRIST A: YES
BACTERIA SPEC CULT: NORMAL
BASE EXCESS BLD CALC-SCNC: 2 MMOL/L
BASE EXCESS BLD CALC-SCNC: 4 MMOL/L
BASE EXCESS BLD CALC-SCNC: 6 MMOL/L
BASOPHILS # BLD: 0 K/UL (ref 0–0.06)
BASOPHILS NFR BLD: 0 % (ref 0–2)
BDY SITE: ABNORMAL
BODY TEMPERATURE: 100.2
BUN SERPL-MCNC: 19 MG/DL (ref 7–18)
BUN/CREAT SERPL: 9 (ref 12–20)
CALCIUM SERPL-MCNC: 8.3 MG/DL (ref 8.5–10.1)
CHLORIDE SERPL-SCNC: 100 MMOL/L (ref 100–108)
CO2 SERPL-SCNC: 30 MMOL/L (ref 21–32)
CREAT SERPL-MCNC: 2.2 MG/DL (ref 0.6–1.3)
DIFFERENTIAL METHOD BLD: ABNORMAL
EOSINOPHIL # BLD: 0.1 K/UL (ref 0–0.4)
EOSINOPHIL NFR BLD: 1 % (ref 0–5)
ERYTHROCYTE [DISTWIDTH] IN BLOOD BY AUTOMATED COUNT: 14.3 % (ref 11.6–14.5)
GAS FLOW.O2 O2 DELIVERY SYS: ABNORMAL L/MIN
GAS FLOW.O2 SETTING OXYMISER: 18 BPM
GLUCOSE BLD STRIP.AUTO-MCNC: 121 MG/DL (ref 70–110)
GLUCOSE BLD STRIP.AUTO-MCNC: 131 MG/DL (ref 70–110)
GLUCOSE BLD STRIP.AUTO-MCNC: 138 MG/DL (ref 70–110)
GLUCOSE SERPL-MCNC: 143 MG/DL (ref 74–99)
HCO3 BLD-SCNC: 28.5 MMOL/L (ref 22–26)
HCO3 BLD-SCNC: 30.4 MMOL/L (ref 22–26)
HCO3 BLD-SCNC: 31.2 MMOL/L (ref 22–26)
HCT VFR BLD AUTO: 41 % (ref 36–48)
HGB BLD-MCNC: 13.1 G/DL (ref 13–16)
INSPIRATION.DURATION SETTING TIME VENT: 0.9 SEC
LYMPHOCYTES # BLD: 1 K/UL (ref 0.9–3.6)
LYMPHOCYTES NFR BLD: 11 % (ref 21–52)
MCH RBC QN AUTO: 27.8 PG (ref 24–34)
MCHC RBC AUTO-ENTMCNC: 32 G/DL (ref 31–37)
MCV RBC AUTO: 86.9 FL (ref 74–97)
MONOCYTES # BLD: 0.9 K/UL (ref 0.05–1.2)
MONOCYTES NFR BLD: 10 % (ref 3–10)
NEUTS SEG # BLD: 7.4 K/UL (ref 1.8–8)
NEUTS SEG NFR BLD: 78 % (ref 40–73)
O2/TOTAL GAS SETTING VFR VENT: 40 %
O2/TOTAL GAS SETTING VFR VENT: 40 %
O2/TOTAL GAS SETTING VFR VENT: 60 %
PCO2 BLD: 57.9 MMHG (ref 35–45)
PCO2 BLD: 58.5 MMHG (ref 35–45)
PCO2 BLD: 60.8 MMHG (ref 35–45)
PEEP RESPIRATORY: 5 CMH2O
PEEP RESPIRATORY: 5 CMH2O
PEEP RESPIRATORY: 8 CMH2O
PH BLD: 7.3 [PH] (ref 7.35–7.45)
PH BLD: 7.31 [PH] (ref 7.35–7.45)
PH BLD: 7.34 [PH] (ref 7.35–7.45)
PIP ISTAT,IPIP: 18
PLATELET # BLD AUTO: 204 K/UL (ref 135–420)
PMV BLD AUTO: 10.6 FL (ref 9.2–11.8)
PO2 BLD: 81 MMHG (ref 80–100)
PO2 BLD: 83 MMHG (ref 80–100)
PO2 BLD: 94 MMHG (ref 80–100)
POTASSIUM SERPL-SCNC: 4.3 MMOL/L (ref 3.5–5.5)
PRESSURE SUPPORT SETTING VENT: 10 CMH2O
PRESSURE SUPPORT SETTING VENT: 8 CMH2O
RBC # BLD AUTO: 4.72 M/UL (ref 4.7–5.5)
SAO2 % BLD: 94 % (ref 92–97)
SAO2 % BLD: 95 % (ref 92–97)
SAO2 % BLD: 96 % (ref 92–97)
SERVICE CMNT-IMP: ABNORMAL
SERVICE CMNT-IMP: NORMAL
SODIUM SERPL-SCNC: 137 MMOL/L (ref 136–145)
SPECIMEN TYPE: ABNORMAL
SPONTANEOUS TIMED, IST: YES
TOTAL RESP. RATE, ITRR: 17
TOTAL RESP. RATE, ITRR: 19
TOTAL RESP. RATE, ITRR: 26
VENTILATION MODE VENT: ABNORMAL
VENTILATION MODE VENT: ABNORMAL
VOLUME CONTROL PLUS IVLCP: YES
VT SETTING VENT: 500 ML
WBC # BLD AUTO: 9.5 K/UL (ref 4.6–13.2)

## 2017-12-23 PROCEDURE — 74011000250 HC RX REV CODE- 250: Performed by: INTERNAL MEDICINE

## 2017-12-23 PROCEDURE — 74011250637 HC RX REV CODE- 250/637: Performed by: INTERNAL MEDICINE

## 2017-12-23 PROCEDURE — 74011250636 HC RX REV CODE- 250/636: Performed by: PHYSICIAN ASSISTANT

## 2017-12-23 PROCEDURE — 74011000250 HC RX REV CODE- 250: Performed by: PHYSICIAN ASSISTANT

## 2017-12-23 PROCEDURE — 94003 VENT MGMT INPAT SUBQ DAY: CPT

## 2017-12-23 PROCEDURE — 36600 WITHDRAWAL OF ARTERIAL BLOOD: CPT

## 2017-12-23 PROCEDURE — 94640 AIRWAY INHALATION TREATMENT: CPT

## 2017-12-23 PROCEDURE — 74011250636 HC RX REV CODE- 250/636: Performed by: INTERNAL MEDICINE

## 2017-12-23 PROCEDURE — 82803 BLOOD GASES ANY COMBINATION: CPT

## 2017-12-23 PROCEDURE — 77030033263 HC DRSG MEPILEX 16-48IN BORD MOLN -B

## 2017-12-23 PROCEDURE — 82962 GLUCOSE BLOOD TEST: CPT

## 2017-12-23 PROCEDURE — 65610000006 HC RM INTENSIVE CARE

## 2017-12-23 PROCEDURE — 71010 XR CHEST PORT: CPT

## 2017-12-23 PROCEDURE — 74011000250 HC RX REV CODE- 250

## 2017-12-23 PROCEDURE — 80048 BASIC METABOLIC PNL TOTAL CA: CPT

## 2017-12-23 PROCEDURE — 74011250637 HC RX REV CODE- 250/637: Performed by: PHYSICIAN ASSISTANT

## 2017-12-23 PROCEDURE — 85025 COMPLETE CBC W/AUTO DIFF WBC: CPT

## 2017-12-23 RX ORDER — DOBUTAMINE HYDROCHLORIDE 200 MG/100ML
INJECTION INTRAVENOUS
Status: DISPENSED
Start: 2017-12-23 | End: 2017-12-23

## 2017-12-23 RX ORDER — METOPROLOL TARTRATE 5 MG/5ML
5 INJECTION INTRAVENOUS EVERY 6 HOURS
Status: DISCONTINUED | OUTPATIENT
Start: 2017-12-23 | End: 2017-12-24

## 2017-12-23 RX ORDER — FUROSEMIDE 10 MG/ML
20 INJECTION INTRAMUSCULAR; INTRAVENOUS ONCE
Status: COMPLETED | OUTPATIENT
Start: 2017-12-23 | End: 2017-12-23

## 2017-12-23 RX ADMIN — FUROSEMIDE 20 MG: 10 INJECTION, SOLUTION INTRAMUSCULAR; INTRAVENOUS at 08:27

## 2017-12-23 RX ADMIN — Medication 50 MCG/HR: at 09:09

## 2017-12-23 RX ADMIN — FUROSEMIDE 20 MG: 10 INJECTION, SOLUTION INTRAMUSCULAR; INTRAVENOUS at 19:24

## 2017-12-23 RX ADMIN — CHLORHEXIDINE GLUCONATE 10 ML: 1.2 RINSE ORAL at 21:05

## 2017-12-23 RX ADMIN — PROPOFOL 30 MCG/KG/MIN: 10 INJECTION, EMULSION INTRAVENOUS at 01:40

## 2017-12-23 RX ADMIN — ACETAMINOPHEN 650 MG: 650 SOLUTION ORAL at 04:10

## 2017-12-23 RX ADMIN — PROPOFOL 30 MCG/KG/MIN: 10 INJECTION, EMULSION INTRAVENOUS at 05:45

## 2017-12-23 RX ADMIN — RACEPINEPHRINE HYDROCHLORIDE 0.5 ML: 11.25 SOLUTION RESPIRATORY (INHALATION) at 11:04

## 2017-12-23 RX ADMIN — METOPROLOL TARTRATE 5 MG: 5 INJECTION, SOLUTION INTRAVENOUS at 12:45

## 2017-12-23 RX ADMIN — CHLORHEXIDINE GLUCONATE 10 ML: 1.2 RINSE ORAL at 08:27

## 2017-12-23 RX ADMIN — ENOXAPARIN SODIUM 40 MG: 40 INJECTION SUBCUTANEOUS at 10:28

## 2017-12-23 RX ADMIN — ACETAMINOPHEN 650 MG: 650 SOLUTION ORAL at 00:20

## 2017-12-23 RX ADMIN — FAMOTIDINE 20 MG: 10 INJECTION, SOLUTION INTRAVENOUS at 08:27

## 2017-12-23 RX ADMIN — ENALAPRILAT 1.25 MG: 1.25 INJECTION, SOLUTION INTRAVENOUS at 18:34

## 2017-12-23 RX ADMIN — IPRATROPIUM BROMIDE AND ALBUTEROL SULFATE 3 ML: .5; 3 SOLUTION RESPIRATORY (INHALATION) at 11:04

## 2017-12-23 RX ADMIN — FAMOTIDINE 20 MG: 10 INJECTION, SOLUTION INTRAVENOUS at 21:05

## 2017-12-23 RX ADMIN — METOPROLOL TARTRATE 5 MG: 5 INJECTION, SOLUTION INTRAVENOUS at 19:24

## 2017-12-23 RX ADMIN — FUROSEMIDE 20 MG: 10 INJECTION, SOLUTION INTRAMUSCULAR; INTRAVENOUS at 10:30

## 2017-12-23 NOTE — PROGRESS NOTES
[facility]  Pulmonary, Critical Care, and Sleep Medicine    Name: Dax Joseph MRN: [id number]   : 1975 Hospital: [address] [place] [address]   Date: 2017          IMPRESSION/ PLAN:      1. S/p  PEA arrest with no neurological damage ? Probably due to severe underlying cardiomyopathy at baseline; OFF pressor support.     2. Low grad fevers with no white count and all Cx data negative so far with no new infiltrates on CXR. Left ankle wound looks good. Resp cx from  from Secretions from ETT are negative. Watch for now. Get DVT study if still febrile tomorrow.     3. Cardiogenic shock: improving. Off pressors.      4. Severe diffuse CMO, likely non ischemic; HTN related vs ?? Viral myocarditis in the past??: once stable and extubated will need evaluation for LVAD/ Heart transplant. Cardiology on board. ASA, statin, add Coreg once tolerates PO, IV metoprolol until then.      5. Acute resp failure due to PEA arrest, cardiogenic shock, B/l infiltrates- cardiogenic: improved, stable on vent. PRN Lasix. Extubated to Bipap. Cont NIPPV for 4-5 hrs and then QHS      6. Mild TRENA- Stable, Non oliguric  ATN due to shock. Monitor for now. PRN lasix. Good U. O       7. Replace Lytes, keep K> 4.0, Mg > 2.0. Avoid meds which can prolong QtC. Daily EKGs      8. Extubated to Bipap. Cont NIPPV for 4-5 hrs and then QHS      9. SS eval once more awake     10. DVT/ GI Ppx      11. Full Code. 12. Probable OHS/PASTORA, cont bipap. 13. Mild compensated Chronic hypercapnea likely due to OHS                 To Summarize:  Bonny Hernández  41 yo AAM, morbidly obese, PASTORA with CPAP at home, HTN, was started on with elective sx- left ankle reconstructive Sx. For surgery patient had a TAD block and 2mg IV versed and 100 mcg IV fentanyl for intubation.  About 15 mins into the procedure when patient was on his right side, he had bradycardia and PEA arrest. He has ROSC in 5 mins with 3 doses of 1 mg epi and high quality CPR. He was not noted to have any hypoxic event or drop in ETCO2 before the arrest. We were notified in sign out by CRNA that he had some ST depressions on tele.   After ROSC he was immediately transferred to MICU for further care. He is cardiogenic shock on Dobutamine. Post arrest EKG shows prolonged Qtc which was also present on EKG from 17. No ST elevation or depressions or blocks have been noted. Set of trops are mildly elevated at 0.07 but CK, CKMB are negative. Stat Echo: LV looks severely hypokinetic 15-20% with diffuse hypokinesis. RV looks normal and moving well. Patient is moving limbs spontaneously and is agitated on the vent. Responds to commands     INTERVAL:    Did well on SBT, followed commands, frothy secretions but good cough. Good Cuff Leak. Extubated to Bipap. Transient stridor noted little later. Improved with Bipap. F/u ABG after 1 hr was acceptable.       Objective:   Vital Signs:    Visit Vitals    /86    Pulse (!) 113    Temp 100.2 °F (37.9 °C)    Resp 16    Ht 5' 9\" (1.753 m)    Wt (!) 160.2 kg (353 lb 2.8 oz)    SpO2 96%    BMI 52.16 kg/m2       O2 Device: BIPAP       Temp (24hrs), Av.3 °F (37.9 °C), Min:99.1 °F (37.3 °C), Max:101.1 °F (38.4 °C)       Intake/Output:   Last shift:         Last 3 shifts:  1901 -  0700  In: 7563 [I.V.:1712]  Out: 1630 [Urine:1630]    Intake/Output Summary (Last 24 hours) at 17 1154  Last data filed at 17 0630   Gross per 24 hour   Intake          2310.32 ml   Output              440 ml   Net          1870.32 ml        Physical Exam:    General: Awake, slightly lethargic but follows commands   HEENT: PERRL   Chest: Bibasilar crackles                Heart: S1 S2, no mRGs   Abdomen: S/morbidly obese, NT   Extremity: + 1 pedal edema, left leg dressing   Neuro: slightly lethargic but follows commands    DATA:  Labs:  Recent Labs      17   0427  17   0400  17   0220   WBC  9.5  7.4  7.0   HGB  13.1 13.2  12.7*   HCT  41.0  42.4  40.7   PLT  204  222  220     Recent Labs      12/23/17   0427  12/22/17   0400  12/21/17   0220   NA  137  137  139   K  4.3  4.0  3.7   CL  100  101  105   CO2  30  30  27   GLU  143*  98  87   BUN  19*  12  14   CREA  2.20*  1.23  0.97   CA  8.3*  8.0*  7.9*   MG   --   2.1   --        Imaging:  [x]I have personally reviewed the patients radiographs  []Radiographs reviewed with radiologist   []No change from prior, tubes and lines in adequate position  []Improved   []Worsening    High complexity decision making was performed during the evaluation of this patient at high risk for decompensation with multiple organ involvement     Above mentioned total time spent on reviewing the case/medical record/data/notes/EMR/patient examination/documentation/coordinating care with nurse/consultants, exclusive of procedures with complex decision making performed and > 50% time spent in face to face evaluation. CC time spent > 50 mins in direct patient care.     Jose E Jain MD

## 2017-12-23 NOTE — PROGRESS NOTES
[facility]      CARDIOLOGY PROGRESS NOTE  RECS:  Telemetry  Metoprolol IV/Enalpril IV/Enoxaparin 40 sq q24/Furosemide 20 mg IV bid  Hold Enalapril  Continue Furosemide   Discussed with Dr. Guillermina Stewart      Total critical care time: 22 minutes. ASSESSMENT:  Hospital Problems  Date Reviewed: 12/20/2017          Codes Class Noted POA    Cardiac arrest with pulseless electrical activity ([facility] ICD-10-CM: I46.9  ICD-9-CM: 427.5  12/23/2017 Unknown        Systolic CHF, chronic (HCC) ICD-10-CM: I50.22  ICD-9-CM: 428.22, 428.0  12/23/2017 Unknown        Hypertension ICD-10-CM: I10  ICD-9-CM: 401.9  12/23/2017 Unknown        Tachycardia ICD-10-CM: R00.0  ICD-9-CM: 785.0  12/23/2017 Unknown        Acute kidney injury ([facility] ICD-10-CM: N17.9  ICD-9-CM: 584.9  12/23/2017 Unknown        Cardiomyopathy ([facility] ICD-10-CM: I42.9  ICD-9-CM: 425.4  12/23/2017 Unknown        Ankle instability, left ICD-10-CM: M25.372  ICD-9-CM: 718.87  12/20/2017 Unknown        Bradycardia following surgery ICD-10-CM: I97.89  ICD-9-CM: 997.1  12/20/2017 Unknown                Interval History:  Extubated. On BIPAP. No c/o dyspnea. 7.33/59/83/31/95%. BUN 19 Cr 2.2 K 4.3. OBJECTIVE:    VS:   Visit Vitals    BP (!) 149/93    Pulse (!) 114    Temp 100.2 °F (37.9 °C)    Resp 16    Ht 5' 9\" (1.753 m)    Wt (!) 160.2 kg (353 lb 2.8 oz)    SpO2 95%    BMI 52.16 kg/m2         Intake/Output Summary (Last 24 hours) at 12/23/17 1429  Last data filed at 12/23/17 0630   Gross per 24 hour   Intake          1975.32 ml   Output              440 ml   Net          1535.32 ml     TELE: normal sinus rhythm    General: No acute distress  HENT: Normocephalic, atraumatic.   Neck :  Supple  Cardiac:  Normal S1/S2  Chest/Lungs:Clear anterior  Abdomen: Soft  Extremities:  UE hand edema      Labs: Results:       Chemistry Recent Labs      12/23/17   0427  12/22/17   0400  12/21/17   0220   GLU  143*  98  87   NA  137  137  139   K 4.3  4.0  3.7   CL  100  101  105   CO2  30  30  27   BUN  19*  12  14   CREA  2.20*  1.23  0.97   CA  8.3*  8.0*  7.9*   MG   --   2.1   --    AGAP  7  6  7   BUCR  9*  10*  14      CBC w/Diff Recent Labs      12/23/17   0427  12/22/17   0400  12/21/17   0220   WBC  9.5  7.4  7.0   RBC  4.72  4.83  4.74   HGB  13.1  13.2  12.7*   HCT  41.0  42.4  40.7   PLT  204  222  220   GRANS  78*  50  66   LYMPH  11*  32  21   EOS  1  6*  2      Cardiac Enzymes No results for input(s): CPK, CKND1, ALINA in the last 72 hours. No lab exists for component: CKRMB, TROIP   Coagulation No results for input(s): PTP, INR, APTT in the last 72 hours. No lab exists for component: INREXT, INREXT    Lipid Panel No results found for: CHOL, CHOLPOCT, CHOLX, CHLST, CHOLV, 393359, HDL, LDL, LDLC, DLDLP, 698658, VLDLC, VLDL, TGLX, TRIGL, TRIGP, TGLPOCT, CHHD, CHHDX   BNP No results for input(s): BNPP in the last 72 hours. Liver Enzymes No results for input(s): TP, ALB, TBIL, AP, SGOT, GPT in the last 72 hours.     No lab exists for component: DBIL   Digoxin    Thyroid Studies No results found for: T4, T3U, TSH, TSHEXT, TSHEXT           Josie Thomas MD   Pager # [phone]

## 2017-12-23 NOTE — ROUTINE PROCESS
Bedside and Verbal shift change report given to Marlene Collins RN (oncoming nurse) by Brittany Smallwood RN (offgoing nurse).  Report included the following information SBAR, Intake/Output, Recent Results and Cardiac Rhythm ST.

## 2017-12-23 NOTE — ROUTINE PROCESS
Patient was caught trying to get out of bed. Patient assist and pulled up into bed. Restrants were intact and side rail were up.

## 2017-12-23 NOTE — ROUTINE PROCESS
Patient extubated, cuff leak heard before extubation, mouth and ETT suctioned. Patient had stridor, MD notified, racemic epi given - patient doing well.

## 2017-12-23 NOTE — PROGRESS NOTES
[facility] progress note    POD# 4 Following  L ankle brostrom complicated by cardiac arrest with PEA. S: Extubated this morning, on bipap. O:    Temp: 100.2 °F (37.9 °C) (12/23/17 0500) Pulse (Heart Rate): (!) 113 (12/23/17 1000) Resp Rate: 16 (12/23/17 1000) BP: 155/86 (12/23/17 1000) O2 Sat (%): 96 % (12/23/17 1050) Weight: (!) 160.2 kg (353 lb 2.8 oz) (12/22/17 0502)     L foot in fracture walker boot.     O:  -Weight bearing status: OK for 30% partial WB in boot for transfers when stable  -dressing change tomorrow  -Dispo: pending

## 2017-12-24 ENCOUNTER — APPOINTMENT (OUTPATIENT)
Dept: GENERAL RADIOLOGY | Age: 42
DRG: 492 | End: 2017-12-24
Attending: PHYSICIAN ASSISTANT
Payer: OTHER GOVERNMENT

## 2017-12-24 PROBLEM — Z99.89 OBSTRUCTIVE SLEEP APNEA ON CPAP: Status: ACTIVE | Noted: 2017-12-24

## 2017-12-24 PROBLEM — G47.33 OBSTRUCTIVE SLEEP APNEA ON CPAP: Status: ACTIVE | Noted: 2017-12-24

## 2017-12-24 LAB
ANION GAP SERPL CALC-SCNC: 6 MMOL/L (ref 3–18)
ARTERIAL PATENCY WRIST A: YES
BACTERIA SPEC CULT: ABNORMAL
BACTERIA SPEC CULT: ABNORMAL
BACTERIA SPEC CULT: NORMAL
BASE EXCESS BLD CALC-SCNC: 12 MMOL/L
BASOPHILS # BLD: 0 K/UL (ref 0–0.06)
BASOPHILS NFR BLD: 0 % (ref 0–2)
BDY SITE: ABNORMAL
BODY TEMPERATURE: 99.1
BUN SERPL-MCNC: 17 MG/DL (ref 7–18)
BUN/CREAT SERPL: 16 (ref 12–20)
CALCIUM SERPL-MCNC: 8.6 MG/DL (ref 8.5–10.1)
CHLORIDE SERPL-SCNC: 100 MMOL/L (ref 100–108)
CO2 SERPL-SCNC: 34 MMOL/L (ref 21–32)
CREAT SERPL-MCNC: 1.05 MG/DL (ref 0.6–1.3)
DIFFERENTIAL METHOD BLD: ABNORMAL
EOSINOPHIL # BLD: 0.3 K/UL (ref 0–0.4)
EOSINOPHIL NFR BLD: 5 % (ref 0–5)
ERYTHROCYTE [DISTWIDTH] IN BLOOD BY AUTOMATED COUNT: 14.1 % (ref 11.6–14.5)
GAS FLOW.O2 O2 DELIVERY SYS: ABNORMAL L/MIN
GLUCOSE BLD STRIP.AUTO-MCNC: 91 MG/DL (ref 70–110)
GLUCOSE BLD STRIP.AUTO-MCNC: 97 MG/DL (ref 70–110)
GLUCOSE SERPL-MCNC: 98 MG/DL (ref 74–99)
GRAM STN SPEC: ABNORMAL
GRAM STN SPEC: NORMAL
HCO3 BLD-SCNC: 36.6 MMOL/L (ref 22–26)
HCT VFR BLD AUTO: 40.7 % (ref 36–48)
HGB BLD-MCNC: 12.9 G/DL (ref 13–16)
LYMPHOCYTES # BLD: 1.3 K/UL (ref 0.9–3.6)
LYMPHOCYTES NFR BLD: 19 % (ref 21–52)
MAGNESIUM SERPL-MCNC: 2.2 MG/DL (ref 1.6–2.6)
MCH RBC QN AUTO: 27.5 PG (ref 24–34)
MCHC RBC AUTO-ENTMCNC: 31.7 G/DL (ref 31–37)
MCV RBC AUTO: 86.8 FL (ref 74–97)
MONOCYTES # BLD: 0.8 K/UL (ref 0.05–1.2)
MONOCYTES NFR BLD: 12 % (ref 3–10)
NEUTS SEG # BLD: 4.4 K/UL (ref 1.8–8)
NEUTS SEG NFR BLD: 64 % (ref 40–73)
O2/TOTAL GAS SETTING VFR VENT: 40 %
PCO2 BLD: 60.7 MMHG (ref 35–45)
PEEP RESPIRATORY: 8 CMH2O
PH BLD: 7.39 [PH] (ref 7.35–7.45)
PIP ISTAT,IPIP: 18
PLATELET # BLD AUTO: 209 K/UL (ref 135–420)
PMV BLD AUTO: 10.6 FL (ref 9.2–11.8)
PO2 BLD: 91 MMHG (ref 80–100)
POTASSIUM SERPL-SCNC: 3.7 MMOL/L (ref 3.5–5.5)
PRESSURE SUPPORT SETTING VENT: 10 CMH2O
RBC # BLD AUTO: 4.69 M/UL (ref 4.7–5.5)
SAO2 % BLD: 96 % (ref 92–97)
SERVICE CMNT-IMP: ABNORMAL
SERVICE CMNT-IMP: ABNORMAL
SERVICE CMNT-IMP: NORMAL
SODIUM SERPL-SCNC: 140 MMOL/L (ref 136–145)
SPECIMEN TYPE: ABNORMAL
SPONTANEOUS TIMED, IST: YES
TOTAL RESP. RATE, ITRR: 12
WBC # BLD AUTO: 6.9 K/UL (ref 4.6–13.2)

## 2017-12-24 PROCEDURE — 36600 WITHDRAWAL OF ARTERIAL BLOOD: CPT

## 2017-12-24 PROCEDURE — 65270000029 HC RM PRIVATE

## 2017-12-24 PROCEDURE — 74011250636 HC RX REV CODE- 250/636: Performed by: INTERNAL MEDICINE

## 2017-12-24 PROCEDURE — 74011250637 HC RX REV CODE- 250/637: Performed by: INTERNAL MEDICINE

## 2017-12-24 PROCEDURE — 80048 BASIC METABOLIC PNL TOTAL CA: CPT | Performed by: PHYSICIAN ASSISTANT

## 2017-12-24 PROCEDURE — 82962 GLUCOSE BLOOD TEST: CPT

## 2017-12-24 PROCEDURE — 74011000250 HC RX REV CODE- 250: Performed by: INTERNAL MEDICINE

## 2017-12-24 PROCEDURE — 94660 CPAP INITIATION&MGMT: CPT

## 2017-12-24 PROCEDURE — 74011000250 HC RX REV CODE- 250: Performed by: PHYSICIAN ASSISTANT

## 2017-12-24 PROCEDURE — 82803 BLOOD GASES ANY COMBINATION: CPT

## 2017-12-24 PROCEDURE — 74011250636 HC RX REV CODE- 250/636

## 2017-12-24 PROCEDURE — 71010 XR CHEST PORT: CPT

## 2017-12-24 PROCEDURE — 74011250636 HC RX REV CODE- 250/636: Performed by: PHYSICIAN ASSISTANT

## 2017-12-24 PROCEDURE — 85025 COMPLETE CBC W/AUTO DIFF WBC: CPT | Performed by: PHYSICIAN ASSISTANT

## 2017-12-24 PROCEDURE — 83735 ASSAY OF MAGNESIUM: CPT | Performed by: PHYSICIAN ASSISTANT

## 2017-12-24 RX ORDER — CARVEDILOL 12.5 MG/1
12.5 TABLET ORAL EVERY 12 HOURS
Status: DISCONTINUED | OUTPATIENT
Start: 2017-12-24 | End: 2017-12-29

## 2017-12-24 RX ORDER — HYDROMORPHONE HYDROCHLORIDE 1 MG/ML
1 INJECTION, SOLUTION INTRAMUSCULAR; INTRAVENOUS; SUBCUTANEOUS
Status: DISCONTINUED | OUTPATIENT
Start: 2017-12-24 | End: 2017-12-29 | Stop reason: HOSPADM

## 2017-12-24 RX ORDER — OXYCODONE AND ACETAMINOPHEN 10; 325 MG/1; MG/1
1 TABLET ORAL
Status: DISCONTINUED | OUTPATIENT
Start: 2017-12-24 | End: 2017-12-29 | Stop reason: HOSPADM

## 2017-12-24 RX ORDER — FUROSEMIDE 40 MG/1
40 TABLET ORAL DAILY
Status: DISCONTINUED | OUTPATIENT
Start: 2017-12-25 | End: 2017-12-29 | Stop reason: HOSPADM

## 2017-12-24 RX ORDER — LISINOPRIL 20 MG/1
20 TABLET ORAL DAILY
Status: DISCONTINUED | OUTPATIENT
Start: 2017-12-24 | End: 2017-12-29 | Stop reason: HOSPADM

## 2017-12-24 RX ORDER — HYDROMORPHONE HYDROCHLORIDE 1 MG/ML
INJECTION, SOLUTION INTRAMUSCULAR; INTRAVENOUS; SUBCUTANEOUS
Status: DISCONTINUED
Start: 2017-12-24 | End: 2017-12-24

## 2017-12-24 RX ORDER — HYDROMORPHONE HCL IN 0.9% NACL 50 MG/50ML
1 PLASTIC BAG, INJECTION (ML) INJECTION
Status: DISCONTINUED | OUTPATIENT
Start: 2017-12-24 | End: 2017-12-24 | Stop reason: RX

## 2017-12-24 RX ORDER — SPIRONOLACTONE 25 MG/1
25 TABLET ORAL DAILY
Status: DISCONTINUED | OUTPATIENT
Start: 2017-12-24 | End: 2017-12-29 | Stop reason: HOSPADM

## 2017-12-24 RX ADMIN — METOPROLOL TARTRATE 5 MG: 5 INJECTION, SOLUTION INTRAVENOUS at 05:58

## 2017-12-24 RX ADMIN — METOPROLOL TARTRATE 5 MG: 5 INJECTION, SOLUTION INTRAVENOUS at 00:23

## 2017-12-24 RX ADMIN — SPIRONOLACTONE 25 MG: 25 TABLET ORAL at 20:56

## 2017-12-24 RX ADMIN — Medication 1 MG: at 01:30

## 2017-12-24 RX ADMIN — CARVEDILOL 12.5 MG: 12.5 TABLET, FILM COATED ORAL at 10:58

## 2017-12-24 RX ADMIN — ENALAPRILAT 1.25 MG: 1.25 INJECTION, SOLUTION INTRAVENOUS at 00:23

## 2017-12-24 RX ADMIN — ENALAPRILAT 1.25 MG: 1.25 INJECTION, SOLUTION INTRAVENOUS at 05:58

## 2017-12-24 RX ADMIN — FAMOTIDINE 20 MG: 10 INJECTION, SOLUTION INTRAVENOUS at 09:28

## 2017-12-24 RX ADMIN — CARVEDILOL 12.5 MG: 12.5 TABLET, FILM COATED ORAL at 20:56

## 2017-12-24 RX ADMIN — ENOXAPARIN SODIUM 40 MG: 40 INJECTION SUBCUTANEOUS at 10:58

## 2017-12-24 RX ADMIN — FUROSEMIDE 20 MG: 10 INJECTION, SOLUTION INTRAMUSCULAR; INTRAVENOUS at 09:28

## 2017-12-24 NOTE — PROGRESS NOTES
[facility]  ICU Progress Note      Name: Jacquelyn Oakes   : 1975   MRN: [id number]   Date: 2017 9:40 AM     [x]I have reviewed the flowsheet and previous days notes. Events overnight reviewed and discussed with nursing staff. Vital signs and records reviewed. 39 yo AAM, morbidly obese, PASTORA with CPAP at home, HTN, was started on with elective sx- left ankle reconstructive Sx. For surgery patient had a TAD block and 2mg IV versed and 100 mcg IV fentanyl for intubation. About 15 mins into the procedure when patient was on his right side, he had bradycardia and PEA arrest. After ROSC he was immediately transferred to MICU for further care. He is cardiogenic shock on Dobutamine. Post arrest EKG shows prolonged Qtc which was also present on EKG from 17. No ST elevation or depressions or blocks have been noted. Set of trops are mildly elevated at 0.07 but CK, CKMB are negative. Pt was extubated on  to [facility] has tolerated well and we plan to change to NC today. Subjective:    No new events overnight. [x]The patient is unable to give any meaningful history or review of systems because the patient is: lethargic this am      [x]The patient is critically ill on      []Mechanical ventilation []Pressors   [x]BiPAP []                 ROS:Review of systems not obtained due to patient factors.       Vital Signs:    Visit Vitals    /65    Pulse 97    Temp 98.5 °F (36.9 °C)    Resp 11    Ht 5' 9\" (1.753 m)    Wt 154 kg (339 lb 8.1 oz)    SpO2 100%    BMI 50.14 kg/m2       O2 Device: BIPAP   O2 Flow Rate (L/min): 50 l/min   Temp (24hrs), Av °F (37.2 °C), Min:98.5 °F (36.9 °C), Max:99.5 °F (37.5 °C)       Intake/Output:   Last shift:         Last 3 shifts:  1901 -  0700  In: 1885.3 [I.V.:960.3]  Out: 2640 [Urine:2640]    Intake/Output Summary (Last 24 hours) at 17 0940  Last data filed at 17 0600   Gross per 24 hour   Intake 0 ml   Output             2250 ml   Net            -2250 ml       Ventilator Settings:  Ventilator Mode: Pressure support  Respiratory Rate  Resp Rate Observed: 17  Back-Up Rate: 22  Insp Time (sec): 0.9 sec  I:E Ratio: 1:2  Ventilator Volumes  Vt Set (ml): 500 ml  Vt Exhaled (Machine Breath) (ml): 509 ml  Vt Spont (ml): 397 ml  Ve Observed (l/min): 8 l/min  Ventilator Pressures  Pressure Support (cm H2O): 8 cm H2O  PIP Observed (cm H2O): 24 cm H2O  Plateau Pressure (cm H2O): 21 cm H2O  MAP (cm H2O): 12  PEEP/VENT (cm H2O): 5 cm H20  Auto PEEP Observed (cm H2O): 0 cm H2O    Physical Exam:    General:Resting/sleeping. Lethargic but awakens to voice/touch stimuli. HEENT:  Anicteric sclerae; pink palpebral conjunctivae; mucosa moist  Resp:  Symmetrical chest expansion, no accessory muscle use; good airway entry; Bibasilar crackles. CV:  S1, S2 present; regular rate and rhythm  GI:  Abdomen soft, non-tender; (+) active bowel sounds  Extremities:  +2 pulses on all extremities; LLE with dressing/boot.    Neurologic:  Lethargic      DATA:     Current Facility-Administered Medications   Medication Dose Route Frequency    HYDROmorphone in NS (DILAUDID) injection 1 mg  1 mg IntraVENous Q4H PRN    HYDROmorphone (PF) (DILAUDID) 1 mg/mL injection        metoprolol (LOPRESSOR) injection 5 mg  5 mg IntraVENous Q6H    enalaprilat (VASOTEC) injection 1.25 mg  1.25 mg IntraVENous Q6H    furosemide (LASIX) injection 20 mg  20 mg IntraVENous BID    influenza vaccine 2017-18 (3 yrs+)(PF) (FLUZONE QUAD/FLUARIX QUAD) injection 0.5 mL  0.5 mL IntraMUSCular PRIOR TO DISCHARGE    glucose chewable tablet 16 g  4 Tab Oral PRN    glucagon (GLUCAGEN) injection 1 mg  1 mg IntraMUSCular PRN    dextrose (D50W) injection syrg 12.5-25 g  25-50 mL IntraVENous PRN    acetaminophen (TYLENOL) solution 650 mg  650 mg Per NG tube Q4H PRN    enoxaparin (LOVENOX) injection 40 mg  40 mg SubCUTAneous Q24H    albuterol-ipratropium (DUO-NEB) 2.5 MG-0.5 MG/3 ML  3 mL Nebulization Q6H PRN    famotidine (PF) (PEPCID) injection 20 mg  20 mg IntraVENous Q12H         Labs: Results:       Chemistry Recent Labs      12/24/17   0405  12/23/17   0427  12/22/17   0400   GLU  98  143*  98   NA  140  137  137   K  3.7  4.3  4.0   CL  100  100  101   CO2  34*  30  30   BUN  17  19*  12   CREA  1.05  2.20*  1.23   CA  8.6  8.3*  8.0*   AGAP  6  7  6   BUCR  16  9*  10*      CBC w/Diff Recent Labs      12/24/17   0405  12/23/17   0427  12/22/17   0400   WBC  6.9  9.5  7.4   RBC  4.69*  4.72  4.83   HGB  12.9*  13.1  13.2   HCT  40.7  41.0  42.4   PLT  209  204  222   GRANS  64  78*  50   LYMPH  19*  11*  32   EOS  5  1  6*      Coagulation No results for input(s): PTP, INR, APTT in the last 72 hours. No lab exists for component: INREXT    Liver Enzymes No results for input(s): TP, ALB, TBIL, AP, SGOT, GPT in the last 72 hours. No lab exists for component: DBIL   ABG Lab Results   Component Value Date/Time    PHI 7.390 12/24/2017 04:57 AM    PCO2I 60.7 (H) 12/24/2017 04:57 AM    PO2I 91 12/24/2017 04:57 AM    HCO3I 36.6 (H) 12/24/2017 04:57 AM    FIO2I 40 12/24/2017 04:57 AM      Microbiology Recent Labs      12/22/17   1640  12/21/17   2026  12/21/17   1658   CULT  RARE NORMAL RESPIRATORY DECLAN  NO GROWTH 3 DAYS  NO GROWTH 3 DAYS  NO GROWTH 2 DAYS          Telemetry: [x]Sinus []A-flutter []Paced    []A-fib []Multiple PVCs                    Imaging:  CXR Results  (Last 48 hours)               12/24/17 0515  XR CHEST PORT Final result    Impression:  IMPRESSION:       Hypoinflation. Cardiomegaly and pulmonary vascular congestion. Stable basilar   parenchymal opacities which may be secondary to atelectasis, infection or edema. Narrative:  EXAM:  XR CHEST PORT       INDICATION:   hypoxemia       COMPARISON: 12/23/2017 and priors. FINDINGS:   Interval removal of ET and enteric tubes. Stable left jugular CVL. Hypoinflation.  Stable enlargement of the cardiac silhouette. Similar pulmonary   vascular congestion. Similar basilar bilateral lung opacities. No pneumothorax   or large pleural effusion. Stable osseous structures. 12/23/17 0541  XR CHEST PORT Final result    Impression:  IMPRESSION:       Hypoventilated with vascular congestion and pulmonary parenchymal opacities with   volume loss left lower lobe more than right. .   Considerations include bibasilar pneumonia, edema from congestive failure,   atelectasis from a variety of etiologies, etc. Vasculature subjectively is   increased when compared with yesterday otherwise stable       Narrative:  CHEST PORTABLE 0455 hours       CPT CODE: 34342       COMPARISON: 12/22. INDICATIONS: Intubated. FINDINGS:        Portable single view chest demonstrates:       Lungs: Lung volumes are small. There is perihilar and basal vascular congestion   with volume loss. Air bronchograms are evident in the left right lower lobes. Cardiac Silhouette And Mediastinal Contours: There is enlargement of the cardiac   contours unchanged. Pleural Spaces: No pleural fluid or pneumothorax is apparent. Bones And Soft Tissues: Unremarkable. Endotracheal tube is present. The tip is 5 cm above the jaswant. Nasogastric tube   is present. The tip extends below the diaphragm   IJ CVL is present with the tip overlying SVC                 CT Results  (Last 48 hours)    None                IMPRESSION:   · Cardiac arrest with PEA  · Acute Respiratory Failure due to PEA, B/L infiltrates, improved. · Cardiogenic Shock: improving, off pressors  · Cardiomyopathy, likely nonischemic  · CHF, Chronic, systolic  · MIld TRENA, stable  · Morbid Obesity        PLAN:   · Resp -  Extubated on 12/23, doing well on BiPAP overnight and plan to change to NC today and NIPPV Qhs.  · ID - Afebrile, with no white count and all Cx data negative so far with no new infiltrates on CXR.   · CVS - Continue to monitor. Hold Enalapril per cardiology. Continue Lasix: Cardiology following. Off pressors. · Heme/onc -   H/H 12.9/40.7 respectively, Platelet count 706. Will continue to monitor. · Metabolic - continue to monitor, replace lytes as needed. · Renal - Non oliguric. Continue to monitor.   Output Urine 2250 24h  · Endocrine - Continue to monitor POC glucose  · Prophylaxis - DVT (lovenox), GI (Pepcid)  · May transfer to medical floor today          My assessment/plan was discussed with:  [x]nursing []PT/OT    []respiratory therapy [x]Dr. Helen Flores   [x]family []         Jagdish Browning NP

## 2017-12-24 NOTE — ROUTINE PROCESS
Bedside and Verbal shift change report given to SHARLENE Calix  (oncoming nurse) by Brittany Treadwell (offgoing nurse).  Report included the following information SBAR, ED Summary, Intake/Output, MAR, Recent Results, Med Rec Status and Cardiac Rhythm SR.

## 2017-12-24 NOTE — PROGRESS NOTES
Patient able to swallow without difficulty. Drinking water without any coughing. Regular diet ordered.

## 2017-12-24 NOTE — PROGRESS NOTES
[facility], [facility]      CARDIOLOGY PROGRESS NOTE  RECS:  Telemetry  Continue Carvedilol 12.5 mg bid/Lisinopril 20 mg every day/Furosemide 40 mg every day  Start Spironolactone 25 mg every day  Consider Bidil  Consider Secubitril-Valsartan  Encourage compliance with CPAP    Critical care management time: 11 minutes. ASSESSMENT:  Hospital Problems  Date Reviewed: 12/20/2017          Codes Class Noted POA    Obstructive sleep apnea on CPAP ICD-10-CM: G47.33, Z99.89  ICD-9-CM: 327.23, V46.8  12/24/2017 Unknown        Cardiac arrest with pulseless [facility] ([facility] ICD-10-CM: I46.9  ICD-9-CM: 427.5  12/23/2017 Unknown        Systolic CHF, chronic (HCC) ICD-10-CM: I50.22  ICD-9-CM: 428.22, 428.0  12/23/2017 Unknown        Hypertension ICD-10-CM: I10  ICD-9-CM: 401.9  12/23/2017 Unknown        Tachycardia ICD-10-CM: R00.0  ICD-9-CM: 785.0  12/23/2017 Unknown        Acute kidney injury ([facility] ICD-10-CM: N17.9  ICD-9-CM: 584.9  12/23/2017 Unknown        Cardiomyopathy ([facility] ICD-10-CM: I42.9  ICD-9-CM: 425.4  12/23/2017 Unknown        Ankle instability, left ICD-10-CM: M25.372  ICD-9-CM: 718.87  12/20/2017 Unknown        Bradycardia following surgery ICD-10-CM: I97.89  ICD-9-CM: 997.1  12/20/2017 Unknown                SUBJECTIVE:  No c/o chest pain. No c/o dyspnea. Somnolent. Neg 390 cc. CXR: Hypoinflation. Cardiomegaly and pulmonary vascular congestion. Stable basilar parenchymal opacities which may be secondary to atelectasis, infection or edema.     OBJECTIVE:    VS:   Visit Vitals    /61    Pulse (!) 102    Temp 98.8 °F (37.1 °C)    Resp 18    Ht 5' 9\" (1.753 m)    Wt 154 kg (339 lb 8.1 oz)    SpO2 97%    BMI 50.14 kg/m2         Intake/Output Summary (Last 24 hours) at 12/24/17 1504  Last data filed at 12/24/17 1200   Gross per 24 hour   Intake              120 ml   Output             2250 ml   Net            -2130 ml     TELE: normal sinus rhythm    General: No acute distress  HENT: Normocephalic, atraumatic. Neck :  Supple  Cardiac:  Normal S1/S2  Chest/Lungs:Clear anterior. Abdomen: Soft  Extremities:  No edema      Labs: Results:       Chemistry Recent Labs      12/24/17 0405 12/23/17 0427 12/22/17   0400   GLU  98  143*  98   NA  140  137  137   K  3.7  4.3  4.0   CL  100  100  101   CO2  34*  30  30   BUN  17  19*  12   CREA  1.05  2.20*  1.23   CA  8.6  8.3*  8.0*   MG  2.2   --   2.1   AGAP  6  7  6   BUCR  16  9*  10*      CBC w/Diff Recent Labs      12/24/17 0405 12/23/17 0427 12/22/17   0400   WBC  6.9  9.5  7.4   RBC  4.69*  4.72  4.83   HGB  12.9*  13.1  13.2   HCT  40.7  41.0  42.4   PLT  209  204  222   GRANS  64  78*  50   LYMPH  19*  11*  32   EOS  5  1  6*      Cardiac Enzymes No results for input(s): CPK, CKND1, ALINA in the last 72 hours. No lab exists for component: CKRMB, TROIP   Coagulation No results for input(s): PTP, INR, APTT in the last 72 hours. No lab exists for component: INREXT, INREXT    Lipid Panel No results found for: CHOL, CHOLPOCT, CHOLX, CHLST, CHOLV, 817940, HDL, LDL, LDLC, DLDLP, 707993, VLDLC, VLDL, TGLX, TRIGL, TRIGP, TGLPOCT, CHHD, CHHDX   BNP No results for input(s): BNPP in the last 72 hours. Liver Enzymes No results for input(s): TP, ALB, TBIL, AP, SGOT, GPT in the last 72 hours.     No lab exists for component: DBIL   Digoxin    Thyroid Studies No results found for: T4, T3U, TSH, TSHEXT, TSHEXT           Constance Cerrato MD   Pager # [phone]

## 2017-12-25 ENCOUNTER — APPOINTMENT (OUTPATIENT)
Dept: GENERAL RADIOLOGY | Age: 42
DRG: 492 | End: 2017-12-25
Attending: PHYSICIAN ASSISTANT
Payer: OTHER GOVERNMENT

## 2017-12-25 LAB
ANION GAP SERPL CALC-SCNC: 6 MMOL/L (ref 3–18)
BASOPHILS # BLD: 0 K/UL (ref 0–0.06)
BASOPHILS NFR BLD: 0 % (ref 0–2)
BUN SERPL-MCNC: 20 MG/DL (ref 7–18)
BUN/CREAT SERPL: 19 (ref 12–20)
CALCIUM SERPL-MCNC: 8.5 MG/DL (ref 8.5–10.1)
CHLORIDE SERPL-SCNC: 100 MMOL/L (ref 100–108)
CO2 SERPL-SCNC: 34 MMOL/L (ref 21–32)
CREAT SERPL-MCNC: 1.05 MG/DL (ref 0.6–1.3)
DIFFERENTIAL METHOD BLD: ABNORMAL
EOSINOPHIL # BLD: 0.5 K/UL (ref 0–0.4)
EOSINOPHIL NFR BLD: 7 % (ref 0–5)
ERYTHROCYTE [DISTWIDTH] IN BLOOD BY AUTOMATED COUNT: 13.9 % (ref 11.6–14.5)
GLUCOSE SERPL-MCNC: 105 MG/DL (ref 74–99)
HCT VFR BLD AUTO: 40.1 % (ref 36–48)
HGB BLD-MCNC: 12.6 G/DL (ref 13–16)
LYMPHOCYTES # BLD: 1.5 K/UL (ref 0.9–3.6)
LYMPHOCYTES NFR BLD: 23 % (ref 21–52)
MAGNESIUM SERPL-MCNC: 2.1 MG/DL (ref 1.6–2.6)
MCH RBC QN AUTO: 27.3 PG (ref 24–34)
MCHC RBC AUTO-ENTMCNC: 31.4 G/DL (ref 31–37)
MCV RBC AUTO: 87 FL (ref 74–97)
MONOCYTES # BLD: 0.8 K/UL (ref 0.05–1.2)
MONOCYTES NFR BLD: 12 % (ref 3–10)
NEUTS SEG # BLD: 3.7 K/UL (ref 1.8–8)
NEUTS SEG NFR BLD: 58 % (ref 40–73)
PLATELET # BLD AUTO: 210 K/UL (ref 135–420)
PMV BLD AUTO: 10.4 FL (ref 9.2–11.8)
POTASSIUM SERPL-SCNC: 3.5 MMOL/L (ref 3.5–5.5)
RBC # BLD AUTO: 4.61 M/UL (ref 4.7–5.5)
SODIUM SERPL-SCNC: 140 MMOL/L (ref 136–145)
WBC # BLD AUTO: 6.5 K/UL (ref 4.6–13.2)

## 2017-12-25 PROCEDURE — 71010 XR CHEST PORT: CPT

## 2017-12-25 PROCEDURE — 65270000029 HC RM PRIVATE

## 2017-12-25 PROCEDURE — 74011250637 HC RX REV CODE- 250/637: Performed by: INTERNAL MEDICINE

## 2017-12-25 PROCEDURE — 74011250636 HC RX REV CODE- 250/636: Performed by: PHYSICIAN ASSISTANT

## 2017-12-25 PROCEDURE — 83735 ASSAY OF MAGNESIUM: CPT | Performed by: PHYSICIAN ASSISTANT

## 2017-12-25 PROCEDURE — 85025 COMPLETE CBC W/AUTO DIFF WBC: CPT | Performed by: PHYSICIAN ASSISTANT

## 2017-12-25 PROCEDURE — 80048 BASIC METABOLIC PNL TOTAL CA: CPT | Performed by: PHYSICIAN ASSISTANT

## 2017-12-25 RX ORDER — HYDRALAZINE HYDROCHLORIDE 25 MG/1
25 TABLET, FILM COATED ORAL EVERY 8 HOURS
Status: DISCONTINUED | OUTPATIENT
Start: 2017-12-25 | End: 2017-12-29

## 2017-12-25 RX ORDER — ISOSORBIDE DINITRATE 5 MG/1
5 TABLET ORAL 3 TIMES DAILY
Status: DISCONTINUED | OUTPATIENT
Start: 2017-12-25 | End: 2017-12-29

## 2017-12-25 RX ORDER — HYDRALAZINE HYDROCHLORIDE 25 MG/1
25 TABLET, FILM COATED ORAL 3 TIMES DAILY
Status: DISCONTINUED | OUTPATIENT
Start: 2017-12-25 | End: 2017-12-25

## 2017-12-25 RX ADMIN — SPIRONOLACTONE 25 MG: 25 TABLET ORAL at 08:34

## 2017-12-25 RX ADMIN — LISINOPRIL 20 MG: 20 TABLET ORAL at 08:34

## 2017-12-25 RX ADMIN — ISOSORBIDE DINITRATE 5 MG: 5 TABLET ORAL at 16:57

## 2017-12-25 RX ADMIN — CARVEDILOL 12.5 MG: 12.5 TABLET, FILM COATED ORAL at 21:54

## 2017-12-25 RX ADMIN — FUROSEMIDE 40 MG: 40 TABLET ORAL at 12:12

## 2017-12-25 RX ADMIN — HYDRALAZINE HYDROCHLORIDE 25 MG: 25 TABLET, FILM COATED ORAL at 16:56

## 2017-12-25 RX ADMIN — CARVEDILOL 12.5 MG: 12.5 TABLET, FILM COATED ORAL at 08:34

## 2017-12-25 RX ADMIN — ENOXAPARIN SODIUM 40 MG: 40 INJECTION SUBCUTANEOUS at 12:11

## 2017-12-25 RX ADMIN — ISOSORBIDE DINITRATE 5 MG: 5 TABLET ORAL at 21:54

## 2017-12-25 NOTE — ROUTINE PROCESS
Bedside and Verbal shift change report given to Vidhya Allred RN (oncoming nurse) by Kev Price (offgoing nurse). Report included the following information SBAR, Kardex, MAR and Recent Results.     SITUATION:    Code Status: Full Code   Reason for Admission: S93.402D ANKLE SPRAIN   Ankle instability, left   Bradycardia following surgery    [facility] day: 5   Problem List:       Hospital Problems  Date Reviewed: 12/20/2017          Codes Class Noted POA    Obstructive sleep apnea on CPAP ICD-10-CM: G47.33, Z99.89  ICD-9-CM: 327.23, V46.8  12/24/2017 Unknown        Cardiac arrest with pulseless electrical activity ([facility] ICD-10-CM: I46.9  ICD-9-CM: 427.5  12/23/2017 Unknown        Systolic CHF, chronic ([facility] ICD-10-CM: I50.22  ICD-9-CM: 428.22, 428.0  12/23/2017 Unknown        Hypertension ICD-10-CM: I10  ICD-9-CM: 401.9  12/23/2017 Unknown        Tachycardia ICD-10-CM: R00.0  ICD-9-CM: 785.0  12/23/2017 Unknown        Acute kidney injury [facility]) ICD-10-CM: N17.9  ICD-9-CM: 584.9  12/23/2017 Unknown        Cardiomyopathy ([facility] ICD-10-CM: I42.9  ICD-9-CM: 425.4  12/23/2017 Unknown        Ankle instability, left ICD-10-CM: M25.372  ICD-9-CM: 718.87  12/20/2017 Unknown        Bradycardia following surgery ICD-10-CM: I97.89  ICD-9-CM: 997.1  12/20/2017 Unknown              BACKGROUND:    Past Medical History:   Past Medical History:   Diagnosis Date    Bronchitis     Hypertension     Sleep apnea     on cpap         Patient taking anticoagulants yes     ASSESSMENT:    Changes in Assessment Throughout Shift: none     Patient has Central Line: yes Reasons if yes: poor venous access   Patient has Bustillos Cath: no Reasons if yes: n/a      Last Vitals:     Vitals:    12/24/17 2053 12/24/17 2059 12/25/17 0046 12/25/17 0434   BP: 128/79  108/68 124/87   Pulse: (!) 101  93 96   Resp: 18  20 22   Temp: 97.5 °F (36.4 °C)  98.8 °F (37.1 °C) 98.6 °F (37 °C)   SpO2: 93%  95% 90%   Weight:  150.1 kg (331 lb)     Height:  IV and DRAINS (will only show if present)   Peripheral IV 12/20/17 Right Hand-Site Assessment: Clean, dry, & intact  Triple Lumen left cvp 12/20/17 Left Internal jugular-Site Assessment: Clean, dry, & intact  [REMOVED] Airway - Endotracheal Tube 12/20/17 Oral-Site Assessment: Clean, dry, & intact  [REMOVED] Orogastric Tube 12/20/17-Site Assessment: Intact     WOUND (if present)   Wound Type:  none   Dressing present Dressing Present : No   Wound Concerns/Notes:  none     PAIN    Pain Assessment    Pain Intensity 1: 0 (12/25/17 0434)              Patient Stated Pain Goal: 0  o Interventions for Pain:  none  o Intervention effective: n/a  o Time of last intervention: n/a   o Reassessment Completed: yes      Last 3 Weights:  Last 3 Recorded Weights in this Encounter    12/22/17 0502 12/23/17 1550 12/24/17 2059   Weight: (!) 160.2 kg (353 lb 2.8 oz) 154 kg (339 lb 8.1 oz) 150.1 kg (331 lb)     Weight change: -3.859 kg (-8 lb 8.1 oz)     INTAKE/OUPUT    Current Shift:      Last three shifts: 12/23 1901 - 12/25 0700  In: 940 [P.O.:940]  Out: 2250 [Urine:2250]     LAB RESULTS     Recent Labs      12/25/17   0330  12/24/17   0405  12/23/17   0427   WBC  6.5  6.9  9.5   HGB  12.6*  12.9*  13.1   HCT  40.1  40.7  41.0   PLT  210  209  204        Recent Labs      12/25/17   0330  12/24/17   0405  12/23/17   0427   NA  140  140  137   K  3.5  3.7  4.3   GLU  105*  98  143*   BUN  20*  17  19*   CREA  1.05  1.05  2.20*   CA  8.5  8.6  8.3*   MG  2.1  2.2   --        RECOMMENDATIONS AND DISCHARGE PLANNING     1. Pending tests/procedures/ Plan of Care or Other Needs: conitue to monior     2. Discharge plan for patient and Needs/Barriers: continue to monitor    3. Estimated Discharge Date: TBD Posted on Whiteboard in Patients Room: yes      4. The patient's care plan was reviewed with the oncoming nurse.        \"HEALS\" SAFETY CHECK      Fall Risk    Total Score: 2    Safety Measures: Safety Measures: Bed/Chair-Wheels locked, Bed in low position, Call light within reach    A safety check occurred in the patient's room between off going nurse and oncoming nurse listed above. The safety check included the below items  Area Items   H  High Alert Medications - Verify all high alert medication drips (heparin, PCA, etc.)   E  Equipment - Suction is set up for ALL patients (with orin)  - Red plugs utilized for all equipment (IV pumps, etc.)  - WOWs wiped down at end of shift.  - Room stocked with oxygen, suction, and other unit-specific supplies   A  Alarms - Bed alarm is set for fall risk patients  - Ensure chair alarm is in place and activated if patient is up in a chair   L  Lines - Check IV for any infiltration  - Bustillos bag is empty if patient has a Bustillos   - Tubing and IV bags are labeled   S  Safety   - Room is clean, patient is clean, and equipment is clean. - Hallways are clear from equipment besides carts. - Fall bracelet on for fall risk patients  - Ensure room is clear and free of clutter  - Suction is set up for ALL patients (with orin)  - Hallways are clear from equipment besides carts.    - Isolation precautions followed, supplies available outside room, sign posted     Frank Corea

## 2017-12-25 NOTE — PROGRESS NOTES
[facility], [facility]      CARDIOLOGY PROGRESS NOTE  RECS:  Telemetry  Continue Carvedilol/Lisinopril/Furosemide/Spironolactone  Hydralazine 25 mg tid  Isosorbide 5 mg tid  Consider Secubitril-Valsartan  ICD prior to discharge    ASSESSMENT:  Hospital Problems  Date Reviewed: 12/20/2017          Codes Class Noted POA    Obstructive sleep apnea on CPAP ICD-10-CM: G47.33, Z99.89  ICD-9-CM: 327.23, V46.8  12/24/2017 Unknown        Cardiac arrest with pulseless electrical activity ([facility] ICD-10-CM: I46.9  ICD-9-CM: 427.5  12/23/2017 Unknown        Systolic CHF, chronic (HCC) ICD-10-CM: I50.22  ICD-9-CM: 428.22, 428.0  12/23/2017 Unknown        Hypertension ICD-10-CM: I10  ICD-9-CM: 401.9  12/23/2017 Unknown        Tachycardia ICD-10-CM: R00.0  ICD-9-CM: 785.0  12/23/2017 Unknown        Acute kidney injury ([facility] ICD-10-CM: N17.9  ICD-9-CM: 584.9  12/23/2017 Unknown        Cardiomyopathy ([facility] ICD-10-CM: I42.9  ICD-9-CM: 425.4  12/23/2017 Unknown        Ankle instability, left ICD-10-CM: M25.372  ICD-9-CM: 718.87  12/20/2017 Unknown        Bradycardia following surgery ICD-10-CM: I97.89  ICD-9-CM: 997.1  12/20/2017 Unknown                SUBJECTIVE:  No c/o chest pain. No c/o dyspnea. Pos 790 cc. OBJECTIVE:    VS:   Visit Vitals    /89 (BP 1 Location: Left arm, BP Patient Position: At rest)    Pulse 91    Temp 97.9 °F (36.6 °C)    Resp 20    Ht 5' 9\" (1.753 m)    Wt 150.1 kg (331 lb)    SpO2 95%    BMI 48.88 kg/m2         Intake/Output Summary (Last 24 hours) at 12/25/17 1119  Last data filed at 12/25/17 0930   Gross per 24 hour   Intake             1180 ml   Output                0 ml   Net             1180 ml     TELE: SR    General: No acute distress  HENT: Normocephalic, atraumatic.   Neck :  Supple  Cardiac:  Normal S1/S2  Chest/Lungs:Clear to auscultation  Abdomen: Soft  Extremities:  No edema      Labs: Results:       Chemistry Recent Labs      12/25/17   0338 12/24/17   0405  12/23/17 0427   GLU  105*  98  143*   NA  140  140  137   K  3.5  3.7  4.3   CL  100  100  100   CO2  34*  34*  30   BUN  20*  17  19*   CREA  1.05  1.05  2.20*   CA  8.5  8.6  8.3*   MG  2.1  2.2   --    AGAP  6  6  7   BUCR  19  16  9*      CBC w/Diff Recent Labs      12/25/17   0330  12/24/17   0405  12/23/17 0427   WBC  6.5  6.9  9.5   RBC  4.61*  4.69*  4.72   HGB  12.6*  12.9*  13.1   HCT  40.1  40.7  41.0   PLT  210  209  204   GRANS  58  64  78*   LYMPH  23  19*  11*   EOS  7*  5  1      Cardiac Enzymes No results for input(s): CPK, CKND1, ALINA in the last 72 hours. No lab exists for component: CKRMB, TROIP   Coagulation No results for input(s): PTP, INR, APTT in the last 72 hours. No lab exists for component: INREXT    Lipid Panel No results found for: CHOL, CHOLPOCT, CHOLX, CHLST, CHOLV, 315974, HDL, LDL, LDLC, DLDLP, 444204, VLDLC, VLDL, TGLX, TRIGL, TRIGP, TGLPOCT, CHHD, CHHDX   BNP No results for input(s): BNPP in the last 72 hours. Liver Enzymes No results for input(s): TP, ALB, TBIL, AP, SGOT, GPT in the last 72 hours.     No lab exists for component: DBIL   Digoxin    Thyroid Studies No results found for: T4, T3U, TSH, TSHEXT           Sarwat Paul MD   Pager # [phone]

## 2017-12-25 NOTE — PROGRESS NOTES
Bedside shift change report given to Kenzie Dow RN (oncoming nurse) by Tameka Hill RN (offgoing nurse). Report included the following information SBAR, Kardex and MAR.

## 2017-12-25 NOTE — PROGRESS NOTES
Problem: Falls - Risk of  Goal: *Absence of Falls  Document Tari Fall Risk and appropriate interventions in the flowsheet.    Outcome: Progressing Towards Goal  Fall Risk Interventions:  Mobility Interventions: Patient to call before getting OOB, PT Consult for mobility concerns    Mentation Interventions: Door open when patient unattended, Update white board, Room close to nurse's station    Medication Interventions: Patient to call before getting OOB, Teach patient to arise slowly    Elimination Interventions: Call light in reach

## 2017-12-26 LAB
ANION GAP SERPL CALC-SCNC: 9 MMOL/L (ref 3–18)
BASOPHILS # BLD: 0 K/UL (ref 0–0.1)
BASOPHILS NFR BLD: 0 % (ref 0–2)
BUN SERPL-MCNC: 20 MG/DL (ref 7–18)
BUN/CREAT SERPL: 18 (ref 12–20)
CALCIUM SERPL-MCNC: 8.8 MG/DL (ref 8.5–10.1)
CHLORIDE SERPL-SCNC: 101 MMOL/L (ref 100–108)
CO2 SERPL-SCNC: 30 MMOL/L (ref 21–32)
CREAT SERPL-MCNC: 1.09 MG/DL (ref 0.6–1.3)
DIFFERENTIAL METHOD BLD: ABNORMAL
EOSINOPHIL # BLD: 0.4 K/UL (ref 0–0.4)
EOSINOPHIL NFR BLD: 7 % (ref 0–5)
ERYTHROCYTE [DISTWIDTH] IN BLOOD BY AUTOMATED COUNT: 13.8 % (ref 11.6–14.5)
GLUCOSE SERPL-MCNC: 87 MG/DL (ref 74–99)
HCT VFR BLD AUTO: 40.2 % (ref 36–48)
HGB BLD-MCNC: 12.9 G/DL (ref 13–16)
LYMPHOCYTES # BLD: 1.5 K/UL (ref 0.9–3.6)
LYMPHOCYTES NFR BLD: 27 % (ref 21–52)
MAGNESIUM SERPL-MCNC: 2 MG/DL (ref 1.6–2.6)
MCH RBC QN AUTO: 27.3 PG (ref 24–34)
MCHC RBC AUTO-ENTMCNC: 32.1 G/DL (ref 31–37)
MCV RBC AUTO: 85.2 FL (ref 74–97)
MONOCYTES # BLD: 0.7 K/UL (ref 0.05–1.2)
MONOCYTES NFR BLD: 12 % (ref 3–10)
NEUTS SEG # BLD: 3.1 K/UL (ref 1.8–8)
NEUTS SEG NFR BLD: 54 % (ref 40–73)
PLATELET # BLD AUTO: 250 K/UL (ref 135–420)
PMV BLD AUTO: 10.3 FL (ref 9.2–11.8)
POTASSIUM SERPL-SCNC: 3.4 MMOL/L (ref 3.5–5.5)
RBC # BLD AUTO: 4.72 M/UL (ref 4.7–5.5)
SODIUM SERPL-SCNC: 140 MMOL/L (ref 136–145)
WBC # BLD AUTO: 5.7 K/UL (ref 4.6–13.2)

## 2017-12-26 PROCEDURE — 94660 CPAP INITIATION&MGMT: CPT

## 2017-12-26 PROCEDURE — 83735 ASSAY OF MAGNESIUM: CPT | Performed by: PHYSICIAN ASSISTANT

## 2017-12-26 PROCEDURE — 85025 COMPLETE CBC W/AUTO DIFF WBC: CPT | Performed by: PHYSICIAN ASSISTANT

## 2017-12-26 PROCEDURE — 80048 BASIC METABOLIC PNL TOTAL CA: CPT | Performed by: PHYSICIAN ASSISTANT

## 2017-12-26 PROCEDURE — 74011250637 HC RX REV CODE- 250/637: Performed by: INTERNAL MEDICINE

## 2017-12-26 PROCEDURE — 74011250636 HC RX REV CODE- 250/636: Performed by: PHYSICIAN ASSISTANT

## 2017-12-26 PROCEDURE — 36415 COLL VENOUS BLD VENIPUNCTURE: CPT | Performed by: PHYSICIAN ASSISTANT

## 2017-12-26 PROCEDURE — 65270000029 HC RM PRIVATE

## 2017-12-26 RX ADMIN — ENOXAPARIN SODIUM 40 MG: 40 INJECTION SUBCUTANEOUS at 09:03

## 2017-12-26 RX ADMIN — ISOSORBIDE DINITRATE 5 MG: 5 TABLET ORAL at 08:59

## 2017-12-26 RX ADMIN — ISOSORBIDE DINITRATE 5 MG: 5 TABLET ORAL at 17:07

## 2017-12-26 RX ADMIN — HYDRALAZINE HYDROCHLORIDE 25 MG: 25 TABLET, FILM COATED ORAL at 21:30

## 2017-12-26 RX ADMIN — FUROSEMIDE 40 MG: 40 TABLET ORAL at 08:59

## 2017-12-26 RX ADMIN — SPIRONOLACTONE 25 MG: 25 TABLET ORAL at 08:59

## 2017-12-26 RX ADMIN — LISINOPRIL 20 MG: 20 TABLET ORAL at 08:59

## 2017-12-26 RX ADMIN — HYDRALAZINE HYDROCHLORIDE 25 MG: 25 TABLET, FILM COATED ORAL at 05:09

## 2017-12-26 RX ADMIN — HYDRALAZINE HYDROCHLORIDE 25 MG: 25 TABLET, FILM COATED ORAL at 17:07

## 2017-12-26 RX ADMIN — CARVEDILOL 12.5 MG: 12.5 TABLET, FILM COATED ORAL at 21:30

## 2017-12-26 RX ADMIN — ISOSORBIDE DINITRATE 5 MG: 5 TABLET ORAL at 21:30

## 2017-12-26 RX ADMIN — CARVEDILOL 12.5 MG: 12.5 TABLET, FILM COATED ORAL at 08:59

## 2017-12-26 NOTE — PROGRESS NOTES

## 2017-12-26 NOTE — ROUTINE PROCESS
During rounds patient RIJ found to be 3/4 of way out of neck with line not being covered with dressing not covering line. RIJ removed Dr. Jayne Renner on call for Dr. Virgen Hitchcock.   Dr. Jayne Renner called and informed of line being out

## 2017-12-26 NOTE — PROGRESS NOTES
[facility]      CARDIOLOGY PROGRESS NOTE  RECS:  1. Cardiac arrest with PEA, which was short, witnessed, and no evidence of hypoxia. Return of spontaneous circulation in 5 minutes or less after 3 doses of epinephrine. Likely secondary to underlying severe cardiomyopathy, which is likely nonischemic in this patient as he had a normal stress test in 2013. Supportive care to continue. PE should be ruled out also off pressors now. 2.  Cardiomyopathy, likely nonischemic. The patient had normal stress test in 08/2013, has never had a cardiac cath. 3.  Congestive heart failure, chronic, systolic. He seems to be in NYHA class 2. Continue po lasix. 4.  Morbid obesity. Will obtain EP consult for ICD given prior history of non ischemic cardiomyopathy.       ASSESSMENT:  Hospital Problems  Date Reviewed: 12/20/2017          Codes Class Noted POA    Obstructive sleep apnea on CPAP ICD-10-CM: G47.33, Z99.89  ICD-9-CM: 327.23, V46.8  12/24/2017 Unknown        Cardiac arrest with pulseless electrical activity ([facility] [place] ICD-10-CM: I46.9  ICD-9-CM: 427.5  12/23/2017 Unknown        Systolic CHF, chronic (HCC) ICD-10-CM: I50.22  ICD-9-CM: 428.22, 428.0  12/23/2017 Unknown        Hypertension ICD-10-CM: I10  ICD-9-CM: 401.9  12/23/2017 Unknown        Tachycardia ICD-10-CM: R00.0  ICD-9-CM: 785.0  12/23/2017 Unknown        Acute kidney injury ([facility] ICD-10-CM: N17.9  ICD-9-CM: 584.9  12/23/2017 Unknown        Cardiomyopathy ([facility] [place] [facility] ICD-10-CM: I42.9  ICD-9-CM: 425.4  12/23/2017 Unknown        Ankle instability, left ICD-10-CM: M25.372  ICD-9-CM: 718.87  12/20/2017 Unknown        Bradycardia following surgery ICD-10-CM: I97.89  ICD-9-CM: 997.1  12/20/2017 Unknown                SUBJECTIVE:  No cp or sob      OBJECTIVE:    VS:   Visit Vitals    /66 (BP 1 Location: Left arm, BP Patient Position: At rest)    Pulse 87    Temp 98.7 °F (37.1 °C)    Resp 22    Ht 5' 9\" (1.753 m)    Wt 149.4 kg (329 lb 4.8 oz)    SpO2 93%    BMI 48.63 kg/m2         Intake/Output Summary (Last 24 hours) at 12/26/17 1425  Last data filed at 12/25/17 1940   Gross per 24 hour   Intake              480 ml   Output                0 ml   Net              480 ml     TELE: STach    General: aaox3    HENT: Normocephalic, atraumatic. Normal external eye. Neck :  no bruit, JVD difficult to assess due to obesity  Cardiac:  regular rate and rhythm  Chest/Lungs:chest clear, no wheezing, rales, normal symmetric air entry  Abdomen: Soft, nontender, no masses  Extremities:  No c/c/edema, peripheral pulses present      Labs: Results:       Chemistry Recent Labs      12/26/17   0522 12/25/17   0330  12/24/17   0405   GLU  87  105*  98   NA  140  140  140   K  3.4*  3.5  3.7   CL  101  100  100   CO2  30  34*  34*   BUN  20*  20*  17   CREA  1.09  1.05  1.05   CA  8.8  8.5  8.6   MG  2.0  2.1  2.2   AGAP  9  6  6   BUCR  18  19  16      CBC w/Diff Recent Labs      12/26/17   0522 12/25/17   0330  12/24/17   0405   WBC  5.7  6.5  6.9   RBC  4.72  4.61*  4.69*   HGB  12.9*  12.6*  12.9*   HCT  40.2  40.1  40.7   PLT  250  210  209   GRANS  54  58  64   LYMPH  27  23  19*   EOS  7*  7*  5      Cardiac Enzymes No results for input(s): CPK, CKND1, ALINA in the last 72 hours. No lab exists for component: CKRMB, TROIP   Coagulation No results for input(s): PTP, INR, APTT in the last 72 hours. No lab exists for component: INREXT, INREXT    Lipid Panel No results found for: CHOL, CHOLPOCT, CHOLX, CHLST, CHOLV, 887274, HDL, LDL, LDLC, DLDLP, 853381, VLDLC, VLDL, TGLX, TRIGL, TRIGP, TGLPOCT, CHHD, CHHDX   BNP No results for input(s): BNPP in the last 72 hours. Liver Enzymes No results for input(s): TP, ALB, TBIL, AP, SGOT, GPT in the last 72 hours.     No lab exists for component: DBIL   Digoxin    Thyroid Studies No results found for: T4, T3U, TSH, TSHEXT, TSHEXT           Dallas Schulte MD

## 2017-12-26 NOTE — ROUTINE PROCESS
Bedside and Verbal shift change report given to rogelio roberto (oncoming nurse) by yon (offgoing nurse). Report included the following information Kardex, Intake/Output and MAR.

## 2017-12-26 NOTE — PROGRESS NOTES
NUTRITION    Nutrition Screen      RECOMMENDATIONS / PLAN:     - Continue current nutrition interventions. - Continue RD inpatient monitoring and evaluation. NUTRITION INTERVENTIONS & DIAGNOSIS:     [x] Meals/Snacks: modified composition   [x] Nutrition education/counseling: heart healthy eating, weight loss diet education 12/26/17    Nutrition Diagnosis: Obese related to excess energy intake as evidenced by BMI of 48.6 kg/m^2. ASSESSMENT:     12/26: Pt extubated 12/23, off bipap and tolerating diet with good appetite, consuming most of meals. Pt agreeable to diet education and interested in making diet/lifestyle changes at discharge. Provided heart healthy, low sodium diet and weight loss education. Handouts and contact information provided. 12/22: Pt tolerating tube feeding at 45 mL/hr and remains sedated on propofol, sedation holiday this morning. 12/21: Pt intubated s/p cardiac arrest during left ankle reconstructive surgery on 12/20.  OG tube to suction, plan to start feeds per MD.     Average po intake adequate to meet patients estimated nutritional needs:   [x] Yes     [x] No   [] Unable to determine at this time    Diet: DIET CARDIAC Regular; 2 GM NA (House Low NA)    Food Allergies: NKFA  Current Appetite:   [x] Good     [] Fair     [] Poor     [] Other:   Appetite/meal intake prior to admission:   [x] Good     [] Fair     [] Poor     [] Other:   Feeding Limitations:  [] Swallowing difficulty    [] Chewing difficulty    [] Other:  Current Meal Intake:   Patient Vitals for the past 100 hrs:   % Diet Eaten   12/25/17 1940 75 %   12/25/17 1512 80 %   12/25/17 0930 60 %   12/24/17 1800 50 %   12/24/17 1200 50 %   12/23/17 0400 0 %   12/23/17 0000 0 %   12/22/17 2100 0 %     BM: 12/25  Skin Integrity: ankle wound  Edema: none   Pertinent Medications: Reviewed: lasix    Recent Labs      12/26/17   0522  12/25/17   0330  12/24/17   0405   NA  140  140  140   K  3.4*  3.5  3.7   CL  101  100  100   CO2 30  34*  34*   GLU  87  105*  98   BUN  20*  20*  17   CREA  1.09  1.05  1.05   CA  8.8  8.5  8.6   MG  2.0  2.1  2.2       Intake/Output Summary (Last 24 hours) at 12/26/17 1415  Last data filed at 12/25/17 1940   Gross per 24 hour   Intake              480 ml   Output                0 ml   Net              480 ml       Anthropometrics:  Ht Readings from Last 1 Encounters:   12/20/17 5' 9\" (1.753 m)     Last 3 Recorded Weights in this Encounter    12/23/17 1550 12/24/17 2059 12/26/17 0433   Weight: 154 kg (339 lb 8.1 oz) 150.1 kg (331 lb) 149.4 kg (329 lb 4.8 oz)     Body mass index is 48.63 kg/(m^2). Obese, Class III     Weight History: weight gain PTA due to decrease in physical activity per family report     Weight Metrics 12/26/2017 12/20/2017   Weight 329 lb 4.8 oz -   BMI - 48.63 kg/m2        Admitting Diagnosis: S93.402D ANKLE SPRAIN  Ankle instability, left  Bradycardia following surgery  Pertinent PMHx: HTN, CAD    Education Needs:        [] None identified  [] Identified - Not appropriate at this time  [x]  Identified and addressed - refer to education log  Learning Limitations:   [x] None identified  [] Identified:   Cultural, Mormon & ethnic food preferences:  [x] None identified    [] Identified and addressed     ESTIMATED NUTRITION NEEDS:     1000 kcal deficit to promote weight loss   Calories: 9976-8033 kcal (MSJx1.2-1.3) based on  [x] Actual  kg     [] IBW   Protein: 119-149gm (0.8-1 gm/kg) based on  [x] Actual BW      [] IBW   Fluid: 1 mL/kcal     MONITORING & EVALUATION:     Nutrition Goal(s): goals modified   1. Po intake of meals will meet >75% of patient estimated nutritional needs within the next 7 days. Outcome:  [] Met/Ongoing    []  Not Met    [x] New/Initial Goal   2. Weight loss of 1-2 lbs over the next 7 days.    Outcome:  [] Met/Ongoing    []  Not Met    [x] New/Initial Goal     Monitoring:   [x] Food and beverage intake   [x] Diet order   [x] Nutrition-focused physical findings   [x] Treatment/therapy   [x] Weight   [] Enteral nutrition intake        Previous Recommendations (for follow-up assessments only):     []   Implemented       []   Not Implemented (RD to address)      [x] No Longer Appropriate     [] No Recommendation Made     Discharge Planning: cardiac diet   [x] Participated in care planning, discharge planning, & interdisciplinary rounds as appropriate      Jeremías Siddiqi, [address], [address] [place]    Pager: [phone]

## 2017-12-26 NOTE — PROGRESS NOTES
Problem: Falls - Risk of  Goal: *Absence of Falls  Document Tari Fall Risk and appropriate interventions in the flowsheet.    Outcome: Progressing Towards Goal  Fall Risk Interventions:  Mobility Interventions: Patient to call before getting OOB    Mentation Interventions: Adequate sleep, hydration, pain control    Medication Interventions: Patient to call before getting OOB    Elimination Interventions: Call light in reach

## 2017-12-26 NOTE — ROUTINE PROCESS
Bedside and Verbal shift change report given to Ariella Riggs RN (oncoming nurse) by Steffi Mcdowell RN (offgoing nurse). Report included the following information Kardex, Intake/Output and MAR.

## 2017-12-27 ENCOUNTER — APPOINTMENT (OUTPATIENT)
Dept: CARDIAC CATH/INVASIVE PROCEDURES | Age: 42
DRG: 492 | End: 2017-12-27
Payer: OTHER GOVERNMENT

## 2017-12-27 ENCOUNTER — APPOINTMENT (OUTPATIENT)
Dept: GENERAL RADIOLOGY | Age: 42
DRG: 492 | End: 2017-12-27
Attending: INTERNAL MEDICINE
Payer: OTHER GOVERNMENT

## 2017-12-27 PROBLEM — Z95.810 AICD (AUTOMATIC CARDIOVERTER/DEFIBRILLATOR) PRESENT: Status: ACTIVE | Noted: 2017-12-27

## 2017-12-27 LAB
ANION GAP SERPL CALC-SCNC: 8 MMOL/L (ref 3–18)
BACTERIA SPEC CULT: NORMAL
BACTERIA SPEC CULT: NORMAL
BASOPHILS # BLD: 0 K/UL (ref 0–0.06)
BASOPHILS NFR BLD: 1 % (ref 0–2)
BUN SERPL-MCNC: 19 MG/DL (ref 7–18)
BUN/CREAT SERPL: 19 (ref 12–20)
CALCIUM SERPL-MCNC: 8.7 MG/DL (ref 8.5–10.1)
CHLORIDE SERPL-SCNC: 100 MMOL/L (ref 100–108)
CO2 SERPL-SCNC: 29 MMOL/L (ref 21–32)
CREAT SERPL-MCNC: 1.01 MG/DL (ref 0.6–1.3)
DIFFERENTIAL METHOD BLD: ABNORMAL
EOSINOPHIL # BLD: 0.5 K/UL (ref 0–0.4)
EOSINOPHIL NFR BLD: 8 % (ref 0–5)
ERYTHROCYTE [DISTWIDTH] IN BLOOD BY AUTOMATED COUNT: 13.6 % (ref 11.6–14.5)
GLUCOSE SERPL-MCNC: 106 MG/DL (ref 74–99)
HCT VFR BLD AUTO: 39.6 % (ref 36–48)
HGB BLD-MCNC: 12.9 G/DL (ref 13–16)
LYMPHOCYTES # BLD: 1.9 K/UL (ref 0.9–3.6)
LYMPHOCYTES NFR BLD: 31 % (ref 21–52)
MAGNESIUM SERPL-MCNC: 2 MG/DL (ref 1.6–2.6)
MCH RBC QN AUTO: 27.6 PG (ref 24–34)
MCHC RBC AUTO-ENTMCNC: 32.6 G/DL (ref 31–37)
MCV RBC AUTO: 84.6 FL (ref 74–97)
MONOCYTES # BLD: 0.6 K/UL (ref 0.05–1.2)
MONOCYTES NFR BLD: 10 % (ref 3–10)
NEUTS SEG # BLD: 3.2 K/UL (ref 1.8–8)
NEUTS SEG NFR BLD: 50 % (ref 40–73)
PLATELET # BLD AUTO: 243 K/UL (ref 135–420)
PMV BLD AUTO: 10 FL (ref 9.2–11.8)
POTASSIUM SERPL-SCNC: 3.4 MMOL/L (ref 3.5–5.5)
RBC # BLD AUTO: 4.68 M/UL (ref 4.7–5.5)
SERVICE CMNT-IMP: NORMAL
SERVICE CMNT-IMP: NORMAL
SODIUM SERPL-SCNC: 137 MMOL/L (ref 136–145)
WBC # BLD AUTO: 6.3 K/UL (ref 4.6–13.2)

## 2017-12-27 PROCEDURE — 76060000033 HC ANESTHESIA 1 TO 1.5 HR

## 2017-12-27 PROCEDURE — 74011000250 HC RX REV CODE- 250: Performed by: INTERNAL MEDICINE

## 2017-12-27 PROCEDURE — 74011250636 HC RX REV CODE- 250/636

## 2017-12-27 PROCEDURE — 85025 COMPLETE CBC W/AUTO DIFF WBC: CPT | Performed by: PHYSICIAN ASSISTANT

## 2017-12-27 PROCEDURE — 0JH608Z INSERTION OF DEFIBRILLATOR GENERATOR INTO CHEST SUBCUTANEOUS TISSUE AND FASCIA, OPEN APPROACH: ICD-10-PCS | Performed by: INTERNAL MEDICINE

## 2017-12-27 PROCEDURE — 74011250637 HC RX REV CODE- 250/637: Performed by: INTERNAL MEDICINE

## 2017-12-27 PROCEDURE — 80048 BASIC METABOLIC PNL TOTAL CA: CPT | Performed by: PHYSICIAN ASSISTANT

## 2017-12-27 PROCEDURE — 74011250636 HC RX REV CODE- 250/636: Performed by: INTERNAL MEDICINE

## 2017-12-27 PROCEDURE — 36415 COLL VENOUS BLD VENIPUNCTURE: CPT | Performed by: PHYSICIAN ASSISTANT

## 2017-12-27 PROCEDURE — 77030018673 EP STUDY

## 2017-12-27 PROCEDURE — 74011000250 HC RX REV CODE- 250

## 2017-12-27 PROCEDURE — 02HK3KZ INSERTION OF DEFIBRILLATOR LEAD INTO RIGHT VENTRICLE, PERCUTANEOUS APPROACH: ICD-10-PCS | Performed by: INTERNAL MEDICINE

## 2017-12-27 PROCEDURE — 02H63KZ INSERTION OF DEFIBRILLATOR LEAD INTO RIGHT ATRIUM, PERCUTANEOUS APPROACH: ICD-10-PCS | Performed by: INTERNAL MEDICINE

## 2017-12-27 PROCEDURE — B51N1ZA FLUOROSCOPY OF LEFT UPPER EXTREMITY VEINS USING LOW OSMOLAR CONTRAST, GUIDANCE: ICD-10-PCS | Performed by: INTERNAL MEDICINE

## 2017-12-27 PROCEDURE — 83735 ASSAY OF MAGNESIUM: CPT | Performed by: PHYSICIAN ASSISTANT

## 2017-12-27 PROCEDURE — 74011636320 HC RX REV CODE- 636/320: Performed by: INTERNAL MEDICINE

## 2017-12-27 PROCEDURE — 71010 XR CHEST PORT: CPT

## 2017-12-27 PROCEDURE — 65660000004 HC RM CVT STEPDOWN

## 2017-12-27 RX ORDER — SODIUM CHLORIDE 0.9 % (FLUSH) 0.9 %
5-10 SYRINGE (ML) INJECTION AS NEEDED
Status: DISCONTINUED | OUTPATIENT
Start: 2017-12-27 | End: 2017-12-27

## 2017-12-27 RX ORDER — PROPOFOL 10 MG/ML
INJECTION, EMULSION INTRAVENOUS AS NEEDED
Status: DISCONTINUED | OUTPATIENT
Start: 2017-12-27 | End: 2017-12-27 | Stop reason: HOSPADM

## 2017-12-27 RX ORDER — FENTANYL CITRATE 50 UG/ML
50 INJECTION, SOLUTION INTRAMUSCULAR; INTRAVENOUS
Status: DISCONTINUED | OUTPATIENT
Start: 2017-12-27 | End: 2017-12-27

## 2017-12-27 RX ORDER — HEPARIN SODIUM 200 [USP'U]/100ML
500 INJECTION, SOLUTION INTRAVENOUS ONCE
Status: COMPLETED | OUTPATIENT
Start: 2017-12-27 | End: 2017-12-27

## 2017-12-27 RX ORDER — FENTANYL CITRATE 50 UG/ML
INJECTION, SOLUTION INTRAMUSCULAR; INTRAVENOUS AS NEEDED
Status: DISCONTINUED | OUTPATIENT
Start: 2017-12-27 | End: 2017-12-27 | Stop reason: HOSPADM

## 2017-12-27 RX ORDER — INSULIN LISPRO 100 [IU]/ML
INJECTION, SOLUTION INTRAVENOUS; SUBCUTANEOUS ONCE
Status: DISCONTINUED | OUTPATIENT
Start: 2017-12-27 | End: 2017-12-27

## 2017-12-27 RX ORDER — MIDAZOLAM HYDROCHLORIDE 1 MG/ML
INJECTION, SOLUTION INTRAMUSCULAR; INTRAVENOUS AS NEEDED
Status: DISCONTINUED | OUTPATIENT
Start: 2017-12-27 | End: 2017-12-27 | Stop reason: HOSPADM

## 2017-12-27 RX ORDER — LIDOCAINE HYDROCHLORIDE 10 MG/ML
1-30 INJECTION, SOLUTION EPIDURAL; INFILTRATION; INTRACAUDAL; PERINEURAL
Status: DISCONTINUED | OUTPATIENT
Start: 2017-12-27 | End: 2017-12-27 | Stop reason: HOSPADM

## 2017-12-27 RX ORDER — OXYCODONE AND ACETAMINOPHEN 5; 325 MG/1; MG/1
1 TABLET ORAL
Status: DISCONTINUED | OUTPATIENT
Start: 2017-12-27 | End: 2017-12-29 | Stop reason: HOSPADM

## 2017-12-27 RX ORDER — PROPOFOL 10 MG/ML
INJECTION, EMULSION INTRAVENOUS
Status: DISCONTINUED | OUTPATIENT
Start: 2017-12-27 | End: 2017-12-27 | Stop reason: HOSPADM

## 2017-12-27 RX ORDER — ONDANSETRON 2 MG/ML
4 INJECTION INTRAMUSCULAR; INTRAVENOUS ONCE
Status: DISCONTINUED | OUTPATIENT
Start: 2017-12-27 | End: 2017-12-27

## 2017-12-27 RX ORDER — DEXTROSE 50 % IN WATER (D50W) INTRAVENOUS SYRINGE
25-50 AS NEEDED
Status: DISCONTINUED | OUTPATIENT
Start: 2017-12-27 | End: 2017-12-27

## 2017-12-27 RX ORDER — MAGNESIUM SULFATE 100 %
4 CRYSTALS MISCELLANEOUS AS NEEDED
Status: DISCONTINUED | OUTPATIENT
Start: 2017-12-27 | End: 2017-12-27

## 2017-12-27 RX ORDER — PHENYLEPHRINE HCL IN 0.9% NACL 1 MG/10 ML
SYRINGE (ML) INTRAVENOUS AS NEEDED
Status: DISCONTINUED | OUTPATIENT
Start: 2017-12-27 | End: 2017-12-27 | Stop reason: HOSPADM

## 2017-12-27 RX ORDER — SODIUM CHLORIDE 9 MG/ML
INJECTION, SOLUTION INTRAVENOUS
Status: DISCONTINUED | OUTPATIENT
Start: 2017-12-27 | End: 2017-12-27 | Stop reason: HOSPADM

## 2017-12-27 RX ORDER — LIDOCAINE HYDROCHLORIDE 20 MG/ML
INJECTION, SOLUTION EPIDURAL; INFILTRATION; INTRACAUDAL; PERINEURAL AS NEEDED
Status: DISCONTINUED | OUTPATIENT
Start: 2017-12-27 | End: 2017-12-27 | Stop reason: HOSPADM

## 2017-12-27 RX ORDER — GENTAMICIN SULFATE 40 MG/ML
80 INJECTION, SOLUTION INTRAMUSCULAR; INTRAVENOUS EVERY 12 HOURS
Status: DISCONTINUED | OUTPATIENT
Start: 2017-12-27 | End: 2017-12-28 | Stop reason: HOSPADM

## 2017-12-27 RX ADMIN — PROPOFOL 30 MG: 10 INJECTION, EMULSION INTRAVENOUS at 13:29

## 2017-12-27 RX ADMIN — Medication 100 MCG: at 13:51

## 2017-12-27 RX ADMIN — HYDRALAZINE HYDROCHLORIDE 25 MG: 25 TABLET, FILM COATED ORAL at 05:29

## 2017-12-27 RX ADMIN — FENTANYL CITRATE 50 MCG: 50 INJECTION, SOLUTION INTRAMUSCULAR; INTRAVENOUS at 14:06

## 2017-12-27 RX ADMIN — PROPOFOL 100 MCG/KG/MIN: 10 INJECTION, EMULSION INTRAVENOUS at 13:32

## 2017-12-27 RX ADMIN — GENTAMICIN SULFATE 80 MG: 40 INJECTION, SOLUTION INTRAMUSCULAR; INTRAVENOUS at 13:54

## 2017-12-27 RX ADMIN — HEPARIN SODIUM 1000 UNITS: 200 INJECTION, SOLUTION INTRAVENOUS at 13:31

## 2017-12-27 RX ADMIN — SODIUM CHLORIDE: 9 INJECTION, SOLUTION INTRAVENOUS at 13:13

## 2017-12-27 RX ADMIN — GENTAMICIN SULFATE 80 MG: 40 INJECTION, SOLUTION INTRAMUSCULAR; INTRAVENOUS at 20:57

## 2017-12-27 RX ADMIN — LIDOCAINE HYDROCHLORIDE 30 ML: 10 INJECTION, SOLUTION EPIDURAL; INFILTRATION; INTRACAUDAL; PERINEURAL at 13:32

## 2017-12-27 RX ADMIN — CEFAZOLIN SODIUM IN SODIUM CHLORIDE 0.9% IV SOLN 3 GM/100ML 3 G: 3-0.9/1 SOLUTION at 13:20

## 2017-12-27 RX ADMIN — FENTANYL CITRATE 25 MCG: 50 INJECTION, SOLUTION INTRAMUSCULAR; INTRAVENOUS at 13:34

## 2017-12-27 RX ADMIN — IOPAMIDOL 15 ML: 612 INJECTION, SOLUTION INTRAVENOUS at 13:36

## 2017-12-27 RX ADMIN — OXYCODONE HYDROCHLORIDE AND ACETAMINOPHEN 1 TABLET: 10; 325 TABLET ORAL at 20:57

## 2017-12-27 RX ADMIN — IOPAMIDOL 15 ML: 612 INJECTION, SOLUTION INTRAVENOUS at 13:30

## 2017-12-27 RX ADMIN — ISOSORBIDE DINITRATE 5 MG: 5 TABLET ORAL at 21:00

## 2017-12-27 RX ADMIN — CEFAZOLIN SODIUM IN SODIUM CHLORIDE 0.9% IV SOLN 3 GM/100ML 3 G: 3-0.9/1 SOLUTION at 22:36

## 2017-12-27 RX ADMIN — OXYCODONE HYDROCHLORIDE AND ACETAMINOPHEN 1 TABLET: 5; 325 TABLET ORAL at 16:15

## 2017-12-27 RX ADMIN — Medication 100 MCG: at 13:40

## 2017-12-27 RX ADMIN — LIDOCAINE HYDROCHLORIDE 60 MG: 20 INJECTION, SOLUTION EPIDURAL; INFILTRATION; INTRACAUDAL; PERINEURAL at 13:25

## 2017-12-27 RX ADMIN — HYDRALAZINE HYDROCHLORIDE 25 MG: 25 TABLET, FILM COATED ORAL at 21:00

## 2017-12-27 RX ADMIN — ISOSORBIDE DINITRATE 5 MG: 5 TABLET ORAL at 17:16

## 2017-12-27 RX ADMIN — MIDAZOLAM HYDROCHLORIDE 2 MG: 1 INJECTION, SOLUTION INTRAMUSCULAR; INTRAVENOUS at 13:20

## 2017-12-27 RX ADMIN — CARVEDILOL 12.5 MG: 12.5 TABLET, FILM COATED ORAL at 20:57

## 2017-12-27 RX ADMIN — FENTANYL CITRATE 25 MCG: 50 INJECTION, SOLUTION INTRAMUSCULAR; INTRAVENOUS at 13:50

## 2017-12-27 NOTE — ROUTINE PROCESS
Bedside and Verbal shift change report given to [address] (oncoming nurse) by Scott Fletcher RN (offgoing nurse). Report included the following information Kardex, Intake/Output and MAR.

## 2017-12-27 NOTE — ROUTINE PROCESS
TRANSFER - OUT REPORT:    Verbal report given to Ramon Perla RN (name) on [address]  being transferred to [facility] ([facility]) for routine progression of care       Report consisted of patients Situation, Background, Assessment and   Recommendations(SBAR). Information from the following report(s) SBAR, Kardex, Procedure Summary and MAR was reviewed with the receiving nurse. Lines:   Peripheral IV 12/26/17 Left Arm (Active)   Site Assessment Clean, dry, & intact 12/27/2017  8:11 AM   Phlebitis Assessment 0 12/27/2017  8:11 AM   Infiltration Assessment 0 12/27/2017  8:11 AM   Dressing Status Clean, dry, & intact 12/27/2017  8:11 AM   Dressing Type Transparent 12/27/2017  8:11 AM   Hub Color/Line Status Pink 12/27/2017  8:11 AM       Peripheral IV 12/27/17 Right Hand (Active)   Site Assessment Clean, dry, & intact 12/27/2017 10:10 AM   Phlebitis Assessment 0 12/27/2017 10:10 AM   Infiltration Assessment 0 12/27/2017 10:10 AM   Dressing Status Clean, dry, & intact 12/27/2017 10:10 AM   Dressing Type Transparent 12/27/2017 10:10 AM        Opportunity for questions and clarification was provided.       Patient transported with:   [employer]

## 2017-12-27 NOTE — ROUTINE PROCESS
TRANSFER - IN REPORT:    Verbal report received from [address] Martin RN (name) on [address]  being received from [facility] ([facility]) for routine progression of care      Report consisted of patients Situation, Background, Assessment and   Recommendations(SBAR). Information from the following report(s) SBAR, Kardex, Procedure Summary and MAR was reviewed with the receiving nurse. Opportunity for questions and clarification was provided. Assessment completed upon patients arrival to unit and care assumed.

## 2017-12-27 NOTE — CONSULTS
Cardiac Electrophysiology Consult Note    Consultation request by Diego Goodman MD for advice/opinion related to evaluating S93.402D ANKLE SPRAIN;Ankle instability, left;Bradycardia f*    Date of  Admission: 12/20/2017  5:38 AM   Primary Cardiologist [facility]     Assessment:     Patient Active Problem List   Diagnosis Code    Ankle instability, left M25.372    Bradycardia following surgery I97.89    Cardiac arrest with pulseless electrical activity ([facility] I24.6    Systolic CHF, chronic (HCC) I50.22    Hypertension I10    Tachycardia R00.0    Acute kidney injury ([facility] N17.9    Cardiomyopathy ([facility] I42.9    Obstructive sleep apnea on CPAP G47.33, Z99.89       -s/p cardiac arrest, PEA during anesthesia 12/20/17. ROSC in 5 minutes. Extubated and off pressors.    -Post-op bradycardia, irreversible symptomatic bradycardia with need for long term pacing  -Acute respiratory failure, resolved  -s/p left ankle reconstruction 12/20/17  -Nonischemic cardiomyopathy since 2013 with EF 20% at that time. Stress test without ischemia. Echo unchanged with EF 20% by echo 12/20/17. Followed at [address] [place] [address].  -Chronic class II systolic heart failure  -PASTORA on CPAP  -h/o HTN  -Obesity weighing 329 lbs  -No h/o MI  - msec, unlikely to benefit from biventricular pacing  -Life expectancy > 1 year  -Chronic ACEI and beta-blocker > 3 months prior to admission    Primary cardiologist [facility]     Plan:     Plan AICD implantation for primary prevention associated with heart failure and recent cardiac arrest with bradycardia. He works in [address] [place] [address] [employer] and has 6 kids. 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 It might also affect his ability to operate heavy machinery depending upon his employer. He works as an . He understands the risks as well with additional anesthesia.   All questions answered and discussed with patient and significant other at bedside as well. All risks, benefits, alternatives discussed. Plan moderate sedation if anesthesia not available. Risks included but not limited to pain, infection, bleeding, deep venous thrombosis with chronic swelling of arm, anesthesia reactions, pneumothorax, hemothorax, emergent open heart surgery, and death. All questions answered. In regards to AICD, I discussed long term issues of regular monitoring at least every 3 months, possibilities of lead and device malfunction including inappropriate shocks, as well as inability to obtain a commercial drivers license, and interference with arc welding and magnetic field exposure restrictions. History of Present Illness: This is a 40 y.o. male admitted for S93.402D ANKLE SPRAIN;Ankle instability, left;Bradycardia f*. Patient complains of:    Admitted 12/20/17 after PEA arrest following ankle surgery. Initially intubated requiring pressors, now improved. Asked to see for AICD given prolonged nonischemic cardiomyopathy despite treatment with ACE/BB since 2013. EF unchanged at 20%. Chronic exertional dyspnea. No chest pain or syncope. Follows at [place]. On ACEI/BB for years.     Review of Symptoms:  Except as stated above include:  Constitutional:  negative  Respiratory:  dyspnea  Cardiovascular:  negative  Gastrointestinal: negative  Genitourinary:  negative  Musculoskeletal:  Negative  Neurological:  Negative  Dermatological:  Negative  Endocrinological: Negative  Psychological:  Negative     Past Medical History:     Past Medical History:   Diagnosis Date    Bronchitis     Hypertension     Sleep apnea     on cpap         Social History:     Social History     Social History    Marital status:      Spouse name: N/A    Number of children: N/A    Years of education: N/A     Social History Main Topics    Smoking status: Never Smoker    Smokeless tobacco: Never Used    Alcohol use None    Drug use: No    Sexual activity: Not Asked     Other Topics Concern    None     Social History Narrative        Family History:   History reviewed. No pertinent family history.      Medications:   No Known Allergies     Current Facility-Administered Medications   Medication Dose Route Frequency    isosorbide dinitrate (ISORDIL) tablet 5 mg  5 mg Oral TID    hydrALAZINE (APRESOLINE) tablet 25 mg  25 mg Oral Q8H    furosemide (LASIX) tablet 40 mg  40 mg Oral DAILY    carvedilol (COREG) tablet 12.5 mg  12.5 mg Oral Q12H    lisinopril (PRINIVIL, ZESTRIL) tablet 20 mg  20 mg Oral DAILY    oxyCODONE-acetaminophen (PERCOCET 10)  mg per tablet 1 Tab  1 Tab Oral Q6H PRN    spironolactone (ALDACTONE) tablet 25 mg  25 mg Oral DAILY    HYDROmorphone (PF) (DILAUDID) injection 1 mg  1 mg IntraVENous Q4H PRN    influenza vaccine 2017-18 (3 yrs+)(PF) (FLUZONE QUAD/FLUARIX QUAD) injection 0.5 mL  0.5 mL IntraMUSCular PRIOR TO DISCHARGE    glucose chewable tablet 16 g  4 Tab Oral PRN    glucagon (GLUCAGEN) injection 1 mg  1 mg IntraMUSCular PRN    dextrose (D50W) injection syrg 12.5-25 g  25-50 mL IntraVENous PRN    albuterol-ipratropium (DUO-NEB) 2.5 MG-0.5 MG/3 ML  3 mL Nebulization Q6H PRN         Physical Exam:     Visit Vitals    /71 (BP 1 Location: Left arm, BP Patient Position: At rest)    Pulse 95    Temp 98.6 °F (37 °C)    Resp 18    Ht 5' 9\" (1.753 m)    Wt 149.2 kg (329 lb)    SpO2 93%    BMI 48.58 kg/m2     BP Readings from Last 3 Encounters:   12/27/17 112/71     Pulse Readings from Last 3 Encounters:   12/27/17 95     Wt Readings from Last 3 Encounters:   12/27/17 149.2 kg (329 lb)       General:  alert, cooperative, no distress, appears stated age  Neck:  nontender  Lungs:  clear to auscultation bilaterally  Heart:  regular rate and rhythm, S1, S2 normal, no murmur, click, rub or gallop  Abdomen:  abdomen is soft without significant tenderness, masses, organomegaly or guarding  Extremities: extremities normal, atraumatic, no cyanosis or edema  Skin: Warm and dry.  no hyperpigmentation, vitiligo, or suspicious lesions  Neuro: alert, oriented x3, affect appropriate, no focal neurological deficits, moves all extremities well, no involuntary movements, reflexes at knee and ankle intact  Psych: non focal     Data Review:     Recent Labs      12/27/17   0435  12/26/17   0522  12/25/17   0330   WBC  6.3  5.7  6.5   HGB  12.9*  12.9*  12.6*   HCT  39.6  40.2  40.1   PLT  243  250  210     Recent Labs      12/27/17   0435  12/26/17   0522  12/25/17   0330   NA  137  140  140   K  3.4*  3.4*  3.5   CL  100  101  100   CO2  29  30  34*   GLU  106*  87  105*   BUN  19*  20*  20*   CREA  1.01  1.09  1.05   CA  8.7  8.8  8.5   MG  2.0  2.0  2.1       Results for orders placed or performed during the hospital encounter of 12/20/17   EKG, 12 LEAD, INITIAL   Result Value Ref Range    Ventricular Rate 98 BPM    Atrial Rate 98 BPM    P-R Interval 160 ms    QRS Duration 100 ms    Q-T Interval 432 ms    QTC Calculation (Bezet) 551 ms    Calculated P Axis 27 degrees    Calculated R Axis 24 degrees    Calculated T Axis -40 degrees    Diagnosis       Normal sinus rhythm  Nonspecific T wave abnormality  Prolonged QT  Abnormal ECG  When compared with ECG of 14-DEC-2017 13:46,  premature ventricular complexes are no longer present  T wave inversion no longer evident in Lateral leads  Confirmed by Charley Dominguez ([phone]) on 12/20/2017 10:32:28 AM         All Cardiac Markers in the last 24 hours:  No results found for: CPK, CK, CKMMB, CKMB, RCK3, CKMBT, CKNDX, CKND1, ALINA, TROPT, TROIQ, JIMMY, TROPT, TNIPOC, BNP, BNPP    Last Lipid:  No results found for: CHOL, CHOLX, CHLST, CHOLV, HDL, LDL, LDLC, DLDLP, TGLX, TRIGL, TRIGP, CHHD, CHHDX    Signed By: Kimberly Carey MD     December 27, 2017

## 2017-12-27 NOTE — DISCHARGE INSTRUCTIONS
INSTRUCTIONS FROM DR. Carmencita Esteban:  Disposition:  Will need follow-up with device/wound check in 7-10 days in my office. Please contact office at [phone] to confirm appointment. Main Office:    [address]    Restrictions: For affected arm:  No lifting greater than 10 lbs or lifting elbow above shoulder for 4 weeks. Keep incision clean and dry for a total of 72 hours after procedure. Remove dressing in 24 hours if not already removed. Please remove the steristrips (small white adhesive strips over wound) after 7 days if they have not already fallen off. No hot tubs or pools for 2 weeks. OK to shower with \"pat\" dry incision after 72 hours. No driving ideally for 2 weeks due to concern for airbag. DISCHARGE SUMMARY from Nurse    PATIENT INSTRUCTIONS:      Report the following to your surgeon:  · Excessive pain, swelling, redness or odor of or around the surgical area  · Temperature over 100.5  · Nausea and vomiting lasting longer than 4 hours or if unable to take medications  · Any signs of decreased circulation or nerve impairment to extremity: change in color, persistent  numbness, tingling, coldness or increase pain  · Any questions    What to do at Home:  Recommended activity: No heavy lifting, pushing, pulling with the implant side for 4 weeks      *  Please give a list of your current medications to your Primary Care Provider. *  Please update this list whenever your medications are discontinued, doses are      changed, or new medications (including over-the-counter products) are added. *  Please carry medication information at all times in case of emergency situations. These are general instructions for a healthy lifestyle:    No smoking/ No tobacco products/ Avoid exposure to second hand smoke  Surgeon General's Warning:  Quitting smoking now greatly reduces serious risk to your health.     Obesity, smoking, and sedentary lifestyle greatly increases your risk for illness    A healthy diet, regular physical exercise & weight monitoring are important for maintaining a healthy lifestyle    You may be retaining fluid if you have a history of heart failure or if you experience any of the following symptoms:  Weight gain of 3 pounds or more overnight or 5 pounds in a week, increased swelling in our hands or feet or shortness of breath while lying flat in bed. Please call your doctor as soon as you notice any of these symptoms; do not wait until your next office visit. Recognize signs and symptoms of STROKE:    F-face looks uneven    A-arms unable to move or move unevenly    S-speech slurred or non-existent    T-time-call 911 as soon as signs and symptoms begin-DO NOT go       Back to bed or wait to see if you get better-TIME IS BRAIN. Warning Signs of HEART ATTACK     Call 911 if you have these symptoms:   Chest discomfort. Most heart attacks involve discomfort in the center of the chest that lasts more than a few minutes, or that goes away and comes back. It can feel like uncomfortable pressure, squeezing, fullness, or pain.  Discomfort in other areas of the upper body. Symptoms can include pain or discomfort in one or both arms, the back, neck, jaw, or stomach.  Shortness of breath with or without chest discomfort.  Other signs may include breaking out in a cold sweat, nausea, or lightheadedness. Don't wait more than five minutes to call 911 - MINUTES MATTER! Fast action can save your life. Calling 911 is almost always the fastest way to get lifesaving treatment. Emergency Medical Services staff can begin treatment when they arrive -- up to an hour sooner than if someone gets to the hospital by car. The discharge information has been reviewed with the patient. The patient verbalized understanding.   Discharge medications reviewed with the patient and appropriate educational materials and side effects teaching were provided.   ___________________________________________________________________________________________________________________________________

## 2017-12-27 NOTE — PROGRESS NOTES
Met with patient and Lee Murillo, #[phone], at  Bedside  Patient admitted for an elective ankle surgery 12/20/17 and coded  INS: Worker's comp  : Ling Watson, [facility]  CHF dx back in 2013, patient has been taking medications for this since dx  Today, 12/27, AICD implanted  Prior to admission was working at [employer] full time  + car/+ drive  + ride home at D/C with family  Lives in a [facility], 12 steps inside  3 steps at entrance + 1 step  [facility] ([facility] )  [facility]  Lives in [address]  **Would suggest [address] at DC, SN/PT/OT, Worker's comp would set this up and choose agency**  CM will continue to follow with IDT to assist with DC needs identified

## 2017-12-27 NOTE — ANESTHESIA PREPROCEDURE EVALUATION
Anesthetic History     Other anesthesia complications     Comments: Cardiac arrest during induction one week ago     Review of Systems / Medical History  Patient summary reviewed and pertinent labs reviewed    Pulmonary        Sleep apnea: CPAP           Neuro/Psych   Within defined limits           Cardiovascular    Hypertension: poorly controlled      CHF: NYHA Classification III, orthopnea, dyspnea on exertion  Dysrhythmias   Past MI, CAD and hyperlipidemia    Exercise tolerance: <4 METS  Comments: EF 20%   GI/Hepatic/Renal  Within defined limits              Endo/Other        Morbid obesity     Other Findings   Comments:   Risk Factors for Postoperative nausea/vomiting:       History of postoperative nausea/vomiting? NO       Female? NO       Motion sickness? NO       Intended opioid administration for postoperative analgesia? NO      Smoking Abstinence  Current Smoker? NO  Elective Surgery? YES  Seen preoperatively by anesthesiologist or proxy prior to day of surgery? YES  Pt abstained from smoking 24 hours prior to anesthesia?  YES           Physical Exam    Airway  Mallampati: I  TM Distance: > 6 cm  Neck ROM: normal range of motion   Mouth opening: Normal     Cardiovascular  Regular rate and rhythm,  S1 and S2 normal,  no murmur, click, rub, or gallop             Dental    Dentition: Poor dentition  Comments: Cracked upper incisor   Pulmonary      Decreased breath sounds: bilateral           Abdominal  GI exam deferred       Other Findings            Anesthetic Plan    ASA: 4  Anesthesia type: MAC          Induction: Intravenous  Anesthetic plan and risks discussed with: Patient

## 2017-12-27 NOTE — PROCEDURES
Procedure:  1. Implantation dual chamber Medtronic MRI compatible AICD for primary prevention and atrial monitoring/pacing. 2.  MAC sedation by anesthesia. 3.  Left axillary venogram.    Diagnosis:  -s/p cardiac arrest, PEA during anesthesia 12/20/17. ROSC in 5 minutes. Extubated and off pressors.    -Post-op bradycardia, irreversible symptomatic bradycardia with need for long term pacing  -Acute respiratory failure, resolved  -s/p left ankle reconstruction 12/20/17  -Nonischemic cardiomyopathy since 2013 with EF 20% at that time. Stress test without ischemia. Echo unchanged with EF 20% by echo 12/20/17. Followed at [facility].  -Chronic class II systolic heart failure  -PASTORA on CPAP  -h/o HTN  -Obesity weighing 329 lbs  -No h/o MI  - msec, unlikely to benefit from biventricular pacing  -Life expectancy > 1 year  -Chronic ACEI and beta-blocker > 3 months prior to admission     Primary cardiologist [facility]    EBL:  15 cc  IV contrast:  30 cc    Procedure:  After informed consent was obtained, the patient was brought to the electrophysiology lab in a fasting and nonsedated state. The left chest was prepped and drapped in the usual sterile fashion. A left axillary venogram revealed vein patency. Local lidocaine was infiltratred below the left clavicle. A 4 cm transverse incision was created on the left chest.  Using electrocautery and blunt dissection, a pocket was made and hemostasis confirmed. Into the left axillary vein was inserted two wires and a 10 and 7 Western Fide sheath. A right ventricular and right atrial lead was positioned under flouroscopic guidance. The sheaths were removed and leads were sutured in place. The pocket was washed with antibiotic solution. The generator was attached to the leads, placed in the pocket, and sutured in place with 0-silk suture. The pocket was closed with 2-0 vicryl in two deep layers and the skin was closed with 4-0 monocryl.   Steristrips and dressing were applied. The patient left the lab in stable condition. No DFT testing was performed due to difficulty in adequately sedating the patient. Impression:  -Successful implantation of dual chamber Medtronic AICD for primary prevention and pacing.  -Monitor overnight with CXR/interrogation in AM.  -Wound check in my office in 7-10 days, then follow-up primary cardiologist, [facility], in 6 weeks.     Thank you kindly for allowing me to participate in this patient's care.      -----------------------------------------------------------------------------------------------------------------------  AICD Core Measures  Beta-blocker Therapy:   [   ]  Not indicated   [   ]  Intolerant due to [   ]  Allergy/reaction  [   ]  Hypotension   [   ]  Lung disease   [x   ]  Already prescribed  ACEI/ARB Therapy:   [   ]  Not indicated   [   ]  Intolerant due to [   ]  Allergy/reaction  [   ]  Hypotension   [   ]  Renal disease   [ x  ]  Already prescribed  Indication:  [   ]  Device already in place and changeout due to previous VT or primary prevention  [   ]  Secondary prevention with documented spontaneous or inducible cardiac arrest/VT          Primary prevention for:   [   ]  High risk for VT/VF due to long QT/HCM or other inherited familial condition   [   ]  CAD w/prior MI and inducible VT/VF during EP study   [   ]  Prior MI with class II/III heart failure and EF <= 35%   [x   ]  Nonischemic cardiomyopathy with EF < 35% and heart failure class II/III

## 2017-12-27 NOTE — ROUTINE PROCESS
Bedside shift change report given to Violeta Quach RN (oncoming nurse) by Harris Beckwith RN (offgoing nurse). Report included the following information SBAR, Kardex, Procedure Summary, Recent Results and Cardiac Rhythm NSR.

## 2017-12-27 NOTE — PROGRESS NOTES
12/27/17 1423   Vital Signs   Temp 98.2 °F (36.8 °C)   Temp Source Oral   Pulse (Heart Rate) 90   Heart Rate Source Monitor   Cardiac Rhythm NSR   Resp Rate 16   O2 Sat (%) 97 %   Level of Consciousness Responds to Voice   /69   MEWS Score 2   Oxygen Therapy   O2 Device Oxygen mask   Received patient from EP lab in stable condition. Pt arousable to voice. Left chest site clean dry and intact.

## 2017-12-28 ENCOUNTER — APPOINTMENT (OUTPATIENT)
Dept: GENERAL RADIOLOGY | Age: 42
DRG: 492 | End: 2017-12-28
Attending: INTERNAL MEDICINE
Payer: OTHER GOVERNMENT

## 2017-12-28 LAB
ANION GAP SERPL CALC-SCNC: 7 MMOL/L (ref 3–18)
BASOPHILS # BLD: 0 K/UL (ref 0–0.1)
BASOPHILS NFR BLD: 1 % (ref 0–2)
BUN SERPL-MCNC: 14 MG/DL (ref 7–18)
BUN/CREAT SERPL: 13 (ref 12–20)
CALCIUM SERPL-MCNC: 8.7 MG/DL (ref 8.5–10.1)
CHLORIDE SERPL-SCNC: 103 MMOL/L (ref 100–108)
CO2 SERPL-SCNC: 29 MMOL/L (ref 21–32)
CREAT SERPL-MCNC: 1.11 MG/DL (ref 0.6–1.3)
DIFFERENTIAL METHOD BLD: ABNORMAL
EOSINOPHIL # BLD: 0.5 K/UL (ref 0–0.4)
EOSINOPHIL NFR BLD: 7 % (ref 0–5)
ERYTHROCYTE [DISTWIDTH] IN BLOOD BY AUTOMATED COUNT: 13.7 % (ref 11.6–14.5)
GLUCOSE SERPL-MCNC: 89 MG/DL (ref 74–99)
HCT VFR BLD AUTO: 40.9 % (ref 36–48)
HGB BLD-MCNC: 12.9 G/DL (ref 13–16)
LYMPHOCYTES # BLD: 1.9 K/UL (ref 0.9–3.6)
LYMPHOCYTES NFR BLD: 29 % (ref 21–52)
MAGNESIUM SERPL-MCNC: 2.2 MG/DL (ref 1.6–2.6)
MCH RBC QN AUTO: 26.7 PG (ref 24–34)
MCHC RBC AUTO-ENTMCNC: 31.5 G/DL (ref 31–37)
MCV RBC AUTO: 84.5 FL (ref 74–97)
MONOCYTES # BLD: 0.8 K/UL (ref 0.05–1.2)
MONOCYTES NFR BLD: 13 % (ref 3–10)
NEUTS SEG # BLD: 3.4 K/UL (ref 1.8–8)
NEUTS SEG NFR BLD: 50 % (ref 40–73)
PLATELET # BLD AUTO: 254 K/UL (ref 135–420)
PMV BLD AUTO: 9.9 FL (ref 9.2–11.8)
POTASSIUM SERPL-SCNC: 3.9 MMOL/L (ref 3.5–5.5)
RBC # BLD AUTO: 4.84 M/UL (ref 4.7–5.5)
SODIUM SERPL-SCNC: 139 MMOL/L (ref 136–145)
WBC # BLD AUTO: 6.7 K/UL (ref 4.6–13.2)

## 2017-12-28 PROCEDURE — 83735 ASSAY OF MAGNESIUM: CPT | Performed by: PHYSICIAN ASSISTANT

## 2017-12-28 PROCEDURE — 71020 XR CHEST PA LAT: CPT

## 2017-12-28 PROCEDURE — 65660000004 HC RM CVT STEPDOWN

## 2017-12-28 PROCEDURE — 74011250637 HC RX REV CODE- 250/637: Performed by: INTERNAL MEDICINE

## 2017-12-28 PROCEDURE — 74011000250 HC RX REV CODE- 250: Performed by: PHYSICIAN ASSISTANT

## 2017-12-28 PROCEDURE — 36415 COLL VENOUS BLD VENIPUNCTURE: CPT | Performed by: PHYSICIAN ASSISTANT

## 2017-12-28 PROCEDURE — 80048 BASIC METABOLIC PNL TOTAL CA: CPT | Performed by: PHYSICIAN ASSISTANT

## 2017-12-28 PROCEDURE — 85025 COMPLETE CBC W/AUTO DIFF WBC: CPT | Performed by: PHYSICIAN ASSISTANT

## 2017-12-28 PROCEDURE — 74011250636 HC RX REV CODE- 250/636: Performed by: INTERNAL MEDICINE

## 2017-12-28 RX ADMIN — HYDRALAZINE HYDROCHLORIDE 25 MG: 25 TABLET, FILM COATED ORAL at 14:01

## 2017-12-28 RX ADMIN — CARVEDILOL 12.5 MG: 12.5 TABLET, FILM COATED ORAL at 09:08

## 2017-12-28 RX ADMIN — FUROSEMIDE 40 MG: 40 TABLET ORAL at 09:08

## 2017-12-28 RX ADMIN — HYDRALAZINE HYDROCHLORIDE 25 MG: 25 TABLET, FILM COATED ORAL at 06:20

## 2017-12-28 RX ADMIN — ISOSORBIDE DINITRATE 5 MG: 5 TABLET ORAL at 17:21

## 2017-12-28 RX ADMIN — LISINOPRIL 20 MG: 20 TABLET ORAL at 09:08

## 2017-12-28 RX ADMIN — CEFAZOLIN SODIUM IN SODIUM CHLORIDE 0.9% IV SOLN 3 GM/100ML 3 G: 3-0.9/1 SOLUTION at 06:20

## 2017-12-28 RX ADMIN — GENTAMICIN SULFATE 80 MG: 40 INJECTION, SOLUTION INTRAMUSCULAR; INTRAVENOUS at 09:09

## 2017-12-28 RX ADMIN — OXYCODONE HYDROCHLORIDE AND ACETAMINOPHEN 1 TABLET: 10; 325 TABLET ORAL at 11:44

## 2017-12-28 RX ADMIN — SPIRONOLACTONE 25 MG: 25 TABLET ORAL at 09:09

## 2017-12-28 RX ADMIN — HYDRALAZINE HYDROCHLORIDE 25 MG: 25 TABLET, FILM COATED ORAL at 22:03

## 2017-12-28 RX ADMIN — OXYCODONE HYDROCHLORIDE AND ACETAMINOPHEN 1 TABLET: 10; 325 TABLET ORAL at 22:03

## 2017-12-28 RX ADMIN — CARVEDILOL 12.5 MG: 12.5 TABLET, FILM COATED ORAL at 22:03

## 2017-12-28 RX ADMIN — ISOSORBIDE DINITRATE 5 MG: 5 TABLET ORAL at 22:03

## 2017-12-28 RX ADMIN — ISOSORBIDE DINITRATE 5 MG: 5 TABLET ORAL at 09:09

## 2017-12-28 NOTE — PROGRESS NOTES
[facility]  -  Progress Note      Patient: Gianna Tee MRN: [id number]  SSN: [id number]    YOB: 1975  Age: 40 y.o. Sex: male      Admit Date: 12/20/2017     The patient was seen, examined, and independently evaluated and I agree with the below assessment and plan by Terry Ly PA-C with the following comments. The pacer site appears to be clean and dry and the patient's CXR shows good positioning of the leads reportedly, but signs of moderate pulmonary congestion.  Will sign off for now and plan to see the patient in the office one time in 7-10 days for pacer site check and long term follow up from [facility] by Dr. Renata Delgado or Dr. Lucy Byrne List:     Hospital Problems  Date Reviewed: 12/20/2017          Codes Class Noted POA    * (Principal)AICD (automatic cardioverter/defibrillator) present ICD-10-CM: Z95.810  ICD-9-CM: V45.02  12/27/2017 Unknown    Overview Signed 12/27/2017  2:10 PM by Kacie Musa MD     Medtronic aicd 12/27/17             Obstructive sleep apnea on CPAP ICD-10-CM: G47.33, Z99.89  ICD-9-CM: 327.23, V46.8  12/24/2017 Unknown        Cardiac arrest with pulseless electrical activity ([facility] [address] ICD-10-CM: I46.9  ICD-9-CM: 427.5  12/23/2017 Unknown        Systolic CHF, chronic ([facility] [address] ICD-10-CM: I50.22  ICD-9-CM: 428.22, 428.0  12/23/2017 Unknown        Hypertension ICD-10-CM: I10  ICD-9-CM: 401.9  12/23/2017 Unknown        Tachycardia ICD-10-CM: R00.0  ICD-9-CM: 785.0  12/23/2017 Unknown        Acute kidney injury ([facility] [address] ICD-10-CM: N17.9  ICD-9-CM: 584.9  12/23/2017 Unknown        Cardiomyopathy ([facility] [address] ICD-10-CM: I42.9  ICD-9-CM: 425.4  12/23/2017 Unknown        Ankle instability, left ICD-10-CM: M25.372  ICD-9-CM: 718.87  12/20/2017 Unknown        Bradycardia following surgery ICD-10-CM: I97.89  ICD-9-CM: 997.1  12/20/2017 Unknown            -s/p PEA cardiac arrest during anesthesia on 12/20/17.   -Post operative bradycardia, irreversible   -s/p implant of dual chamber Medtronic MRI compatible AICD 12/27/17. Device interrogated with normal function this morning.   -Acute respiratory failure, resolved  -s/p left ankle reconstruction 12/20/17  -Nonischemic cardiomyopathy since 2013 with EF 20% at that time. Stress test without ischemia. Echo unchanged with EF 20% by echo 12/20/17. Followed at [facility].  -Chronic class II systolic heart failure  -PASTORA on CPAP  -h/o HTN  -Obesity weighing 329 lbs  -No h/o MI  - msec, unlikely to benefit from biventricular pacing  -Life expectancy > 1 year  -Chronic ACEI and beta-blocker > 3 months prior to admission     Primary cardiologist [facility]    Assessment & Plan:     -Device interrogated this morning by Medtronic rep with normal functioning.   -Wound examined. Good approximation with scant amount of blood. Soft to palpation. No evidence of hematoma. -Repeat CXR with good lead placement. No pneumothorax.   -Patient is to follow up with our office [facility]) for wound check in 7-10 days. He is to follow up with his primary cardiologist (Dr Leidy Rivera) 4 weeks later. Subjective:     Pain well controlled. No complaints.     Objective:      Patient Vitals for the past 8 hrs:   Temp Pulse Resp BP SpO2   12/28/17 0810 97.9 °F (36.6 °C) 92 18 (!) 133/92 93 %   12/28/17 0516 98.4 °F (36.9 °C) 88 19 (!) 128/95 99 %         Patient Vitals for the past 96 hrs:   Weight   12/28/17 0726 150.1 kg (330 lb 14.6 oz)   12/27/17 0412 149.2 kg (329 lb)   12/26/17 0433 149.4 kg (329 lb 4.8 oz)   12/24/17 2059 150.1 kg (331 lb)         Intake/Output Summary (Last 24 hours) at 12/28/17 0902  Last data filed at 12/28/17 0400   Gross per 24 hour   Intake             1500 ml   Output                0 ml   Net             1500 ml       Physical Exam:  General: awake, alert, oriented x 3, wife at bedside  Neck:  Supple, no jvd  Lungs:  Clear to auscultation bilaterally on room air  Heart:  Reg rate and rhythm, L chest wall incision with scant blood, steri strips in place, soft to palpation with no evidence of hematoma, no erythema or signs of infection  Abdomen:  Soft, non-tender  Extremities:  Atraumatic, no edema    Data Review:     Labs: Results:       Chemistry Recent Labs      12/28/17 0337 12/27/17 0435 12/26/17 0522   GLU  89  106*  87   NA  139  137  140   K  3.9  3.4*  3.4*   CL  103  100  101   CO2  29  29  30   BUN  14  19*  20*   CREA  1.11  1.01  1.09   CA  8.7  8.7  8.8   MG  2.2  2.0  2.0   AGAP  7  8  9   BUCR  13  19  18      CBC w/Diff Recent Labs      12/28/17 0337 12/27/17 0435 12/26/17 0522   WBC  6.7  6.3  5.7   RBC  4.84  4.68*  4.72   HGB  12.9*  12.9*  12.9*   HCT  40.9  39.6  40.2   PLT  254  243  250   GRANS  50  50  54   LYMPH  29  31  27   EOS  7*  8*  7*      Cardiac Enzymes No results found for: CPK, CK, CKMMB, CKMB, RCK3, CKMBT, CKNDX, CKND1, ALINA, TROPT, TROIQ, JIMMY, TROPT, TNIPOC, BNP, BNPP   Coagulation No results for input(s): PTP, INR, APTT in the last 72 hours. No lab exists for component: INREXT    Lipid Panel No results found for: CHOL, CHOLPOCT, CHOLX, CHLST, CHOLV, 323324, HDL, LDL, LDLC, DLDLP, 418533, VLDLC, VLDL, TGLX, TRIGL, TRIGP, TGLPOCT, CHHD, CHHDX   BNP No results found for: BNP, BNPP, XBNPT   Liver Enzymes No results for input(s): TP, ALB, TBIL, AP, SGOT, GPT in the last 72 hours.     No lab exists for component: DBIL   Digoxin    Thyroid Studies No results found for: T4, T3U, TSH, TSHEXT         Signed By: BHAVESH Boateng     December 28, 2017

## 2017-12-28 NOTE — PROGRESS NOTES
[facility]      CARDIOLOGY PROGRESS NOTE  RECS:  1. Cardiac arrest with PEA, which was short, witnessed, and no evidence of hypoxia. Return of spontaneous circulation in 5 minutes or less after 3 doses of epinephrine. s/p ICD. 2.  Cardiomyopathy, likely nonischemic. The patient had normal stress test in 08/2013, has never had a cardiac cath. Continue current meds  3. Congestive heart failure, chronic, systolic. He seems to be in NYHA class 2. Continue po lasix. 4.  Morbid obesity. Patient can be discharged home from cardiac standpoint.   ICD site looks okay    ASSESSMENT:  Hospital Problems  Date Reviewed: 12/20/2017          Codes Class Noted POA    * (Principal)AICD (automatic cardioverter/defibrillator) present ICD-10-CM: Z95.810  ICD-9-CM: V45.02  12/27/2017 Unknown    Overview Signed 12/27/2017  2:10 PM by Reynaldo Hernandez MD     Medtronic aicd 12/27/17             Obstructive sleep apnea on CPAP ICD-10-CM: G47.33, Z99.89  ICD-9-CM: 327.23, V46.8  12/24/2017 Unknown        Cardiac arrest with pulseless electrical activity ([facility] [address] ICD-10-CM: I46.9  ICD-9-CM: 427.5  12/23/2017 Unknown        Systolic CHF, chronic ([facility] [address] ICD-10-CM: I50.22  ICD-9-CM: 428.22, 428.0  12/23/2017 Unknown        Hypertension ICD-10-CM: I10  ICD-9-CM: 401.9  12/23/2017 Unknown        Tachycardia ICD-10-CM: R00.0  ICD-9-CM: 785.0  12/23/2017 Unknown        Acute kidney injury ([facility] [address] ICD-10-CM: N17.9  ICD-9-CM: 584.9  12/23/2017 Unknown        Cardiomyopathy ([facility] [address] ICD-10-CM: I42.9  ICD-9-CM: 425.4  12/23/2017 Unknown        Ankle instability, left ICD-10-CM: M25.372  ICD-9-CM: 718.87  12/20/2017 Unknown        Bradycardia following surgery ICD-10-CM: I97.89  ICD-9-CM: 997.1  12/20/2017 Unknown                SUBJECTIVE:  No cp or sob      OBJECTIVE:    VS:   Visit Vitals    BP (!) 133/92    Pulse 92    Temp 97.9 °F (36.6 °C)    Resp 18    Ht 5' 9\" (1.753 m)    Wt 150.1 kg (330 lb 14.6 oz)    SpO2 93%    BMI 48.87 kg/m2         Intake/Output Summary (Last 24 hours) at 12/28/17 0920  Last data filed at 12/28/17 0400   Gross per 24 hour   Intake             1500 ml   Output                0 ml   Net             1500 ml     TELE: STach    General: aaox3    HENT: Normocephalic, atraumatic. Normal external eye. Neck :  no bruit, JVD difficult to assess due to obesity  Cardiac:  regular rate and rhythm  Chest/Lungs:chest clear, no wheezing, rales, normal symmetric air entry  Abdomen: Soft, nontender, no masses  Extremities:  No c/c/edema, peripheral pulses present      Labs: Results:       Chemistry Recent Labs      12/28/17 0337 12/27/17 0435 12/26/17   0522   GLU  89  106*  87   NA  139  137  140   K  3.9  3.4*  3.4*   CL  103  100  101   CO2  29  29  30   BUN  14  19*  20*   CREA  1.11  1.01  1.09   CA  8.7  8.7  8.8   MG  2.2  2.0  2.0   AGAP  7  8  9   BUCR  13  19  18      CBC w/Diff Recent Labs      12/28/17 0337 12/27/17 0435 12/26/17   0522   WBC  6.7  6.3  5.7   RBC  4.84  4.68*  4.72   HGB  12.9*  12.9*  12.9*   HCT  40.9  39.6  40.2   PLT  254  243  250   GRANS  50  50  54   LYMPH  29  31  27   EOS  7*  8*  7*      Cardiac Enzymes No results for input(s): CPK, CKND1, ALINA in the last 72 hours. No lab exists for component: CKRMB, TROIP   Coagulation No results for input(s): PTP, INR, APTT in the last 72 hours. No lab exists for component: INREXT, INREXT    Lipid Panel No results found for: CHOL, CHOLPOCT, CHOLX, CHLST, CHOLV, 372504, HDL, LDL, LDLC, DLDLP, 543312, VLDLC, VLDL, TGLX, TRIGL, TRIGP, TGLPOCT, CHHD, CHHDX   BNP No results for input(s): BNPP in the last 72 hours. Liver Enzymes No results for input(s): TP, ALB, TBIL, AP, SGOT, GPT in the last 72 hours.     No lab exists for component: DBIL   Digoxin    Thyroid Studies No results found for: T4, T3U, TSH, TSHEXT, TSHEXT           Lynda Farrell MD

## 2017-12-29 VITALS
WEIGHT: 315 LBS | DIASTOLIC BLOOD PRESSURE: 67 MMHG | HEIGHT: 69 IN | RESPIRATION RATE: 18 BRPM | HEART RATE: 99 BPM | TEMPERATURE: 97.9 F | OXYGEN SATURATION: 93 % | SYSTOLIC BLOOD PRESSURE: 122 MMHG | BODY MASS INDEX: 46.65 KG/M2

## 2017-12-29 PROCEDURE — 74011250637 HC RX REV CODE- 250/637: Performed by: INTERNAL MEDICINE

## 2017-12-29 RX ORDER — LISINOPRIL 20 MG/1
20 TABLET ORAL DAILY
Qty: 30 TAB | Refills: 0 | Status: SHIPPED | OUTPATIENT
Start: 2017-12-29

## 2017-12-29 RX ORDER — SPIRONOLACTONE 25 MG/1
25 TABLET ORAL DAILY
Qty: 30 TAB | Refills: 0 | Status: SHIPPED | OUTPATIENT
Start: 2017-12-30

## 2017-12-29 RX ORDER — GUAIFENESIN 100 MG/5ML
81 LIQUID (ML) ORAL DAILY
Qty: 30 TAB | Refills: 0 | Status: SHIPPED | OUTPATIENT
Start: 2017-12-29

## 2017-12-29 RX ORDER — CARVEDILOL 25 MG/1
25 TABLET ORAL EVERY 12 HOURS
Status: DISCONTINUED | OUTPATIENT
Start: 2017-12-29 | End: 2017-12-29 | Stop reason: HOSPADM

## 2017-12-29 RX ORDER — FUROSEMIDE 40 MG/1
40 TABLET ORAL DAILY
Qty: 30 TAB | Refills: 0 | Status: SHIPPED | OUTPATIENT
Start: 2017-12-29

## 2017-12-29 RX ORDER — CARVEDILOL 25 MG/1
25 TABLET ORAL 2 TIMES DAILY WITH MEALS
Qty: 60 TAB | Refills: 0 | Status: SHIPPED | OUTPATIENT
Start: 2017-12-29

## 2017-12-29 RX ADMIN — OXYCODONE HYDROCHLORIDE AND ACETAMINOPHEN 1 TABLET: 5; 325 TABLET ORAL at 09:38

## 2017-12-29 RX ADMIN — SPIRONOLACTONE 25 MG: 25 TABLET ORAL at 09:37

## 2017-12-29 RX ADMIN — FUROSEMIDE 40 MG: 40 TABLET ORAL at 09:37

## 2017-12-29 RX ADMIN — LISINOPRIL 20 MG: 20 TABLET ORAL at 09:37

## 2017-12-29 RX ADMIN — CARVEDILOL 12.5 MG: 12.5 TABLET, FILM COATED ORAL at 09:37

## 2017-12-29 RX ADMIN — HYDRALAZINE HYDROCHLORIDE 25 MG: 25 TABLET, FILM COATED ORAL at 06:01

## 2017-12-29 RX ADMIN — ISOSORBIDE DINITRATE 5 MG: 5 TABLET ORAL at 09:38

## 2017-12-29 NOTE — PROGRESS NOTES
0730:  Bedside and Verbal shift change report given to Brooklyn Marquez RN   (oncoming nurse) by Rosita Buck (offgoing nurse). Report included the following information SBAR, Kardex, Intake/Output, MAR, Accordion, Recent Results and Cardiac Rhythm NSR.   1500:  Discharge instructions reviewed with patient at length, he verbalizes understanding all info. Status without acute change noted. Discharged via wheelchair to private vehicle, sig other driving.

## 2017-12-29 NOTE — PROGRESS NOTES
[facility]      CARDIOLOGY PROGRESS NOTE  RECS:  1. Cardiac arrest with PEA, which was short, witnessed, and no evidence of hypoxia. Return of spontaneous circulation in 5 minutes or less after 3 doses of epinephrine. s/p ICD. 2.  Cardiomyopathy, likely nonischemic. The patient had normal stress test in 08/2013, has never had a cardiac cath. Will discontinue hydralazine and isordil-- will increase coreg to 25mg bid and continue lisinopril 20mg po daily. 3.  Congestive heart failure, chronic, systolic. He seems to be in NYHA class 2. Continue po lasix and aldactone. 4.  Morbid obesity. Patient can be discharged home from cardiac standpoint.   ICD site looks okay    ASSESSMENT:  Hospital Problems  Date Reviewed: 12/20/2017          Codes Class Noted POA    * (Principal)AICD (automatic cardioverter/defibrillator) present ICD-10-CM: Z95.810  ICD-9-CM: V45.02  12/27/2017 Unknown    Overview Signed 12/27/2017  2:10 PM by Cass Nicholson MD     Medtronic aicd 12/27/17             Obstructive sleep apnea on CPAP ICD-10-CM: G47.33, Z99.89  ICD-9-CM: 327.23, V46.8  12/24/2017 Unknown        Cardiac arrest with pulseless [facility] ICD-10-CM: I46.9  ICD-9-CM: 427.5  12/23/2017 Unknown        Systolic CHF, chronic (HCC) ICD-10-CM: I50.22  ICD-9-CM: 428.22, 428.0  12/23/2017 Unknown        Hypertension ICD-10-CM: I10  ICD-9-CM: 401.9  12/23/2017 Unknown        Tachycardia ICD-10-CM: R00.0  ICD-9-CM: 785.0  12/23/2017 Unknown        Acute kidney injury [facility]) ICD-10-CM: N17.9  ICD-9-CM: 584.9  12/23/2017 Unknown        Cardiomyopathy ([facility] ICD-10-CM: I42.9  ICD-9-CM: 425.4  12/23/2017 Unknown        Ankle instability, left ICD-10-CM: M25.372  ICD-9-CM: 718.87  12/20/2017 Unknown        Bradycardia following surgery ICD-10-CM: I97.89  ICD-9-CM: 997.1  12/20/2017 Unknown                SUBJECTIVE:  No cp or sob      OBJECTIVE:    VS:   Visit Vitals    /67    Pulse 99    Temp 97.9 °F (36.6 °C)    Resp 18    Ht 5' 9\" (1.753 m)    Wt 149.1 kg (328 lb 12.8 oz)    SpO2 93%    BMI 48.56 kg/m2         Intake/Output Summary (Last 24 hours) at 12/29/17 1302  Last data filed at 12/29/17 1200   Gross per 24 hour   Intake              240 ml   Output              400 ml   Net             -160 ml     TELE: STach    General: aaox3    HENT: Normocephalic, atraumatic. Normal external eye. Neck :  no bruit, JVD difficult to assess due to obesity  Cardiac:  regular rate and rhythm  Chest/Lungs:chest clear, no wheezing, rales, normal symmetric air entry  Abdomen: Soft, nontender, no masses  Extremities:  No c/c/edema, peripheral pulses present      Labs: Results:       Chemistry Recent Labs      12/28/17   0337 12/27/17   0435   GLU  89  106*   NA  139  137   K  3.9  3.4*   CL  103  100   CO2  29  29   BUN  14  19*   CREA  1.11  1.01   CA  8.7  8.7   MG  2.2  2.0   AGAP  7  8   BUCR  13  19      CBC w/Diff Recent Labs      12/28/17   0337 12/27/17   0435   WBC  6.7  6.3   RBC  4.84  4.68*   HGB  12.9*  12.9*   HCT  40.9  39.6   PLT  254  243   GRANS  50  50   LYMPH  29  31   EOS  7*  8*      Cardiac Enzymes No results for input(s): CPK, CKND1, ALINA in the last 72 hours. No lab exists for component: CKRMB, TROIP   Coagulation No results for input(s): PTP, INR, APTT in the last 72 hours. No lab exists for component: INREXT, INREXT    Lipid Panel No results found for: CHOL, CHOLPOCT, CHOLX, CHLST, CHOLV, 146372, HDL, LDL, LDLC, DLDLP, 261900, VLDLC, VLDL, TGLX, TRIGL, TRIGP, TGLPOCT, CHHD, CHHDX   BNP No results for input(s): BNPP in the last 72 hours. Liver Enzymes No results for input(s): TP, ALB, TBIL, AP, SGOT, GPT in the last 72 hours.     No lab exists for component: DBIL   Digoxin    Thyroid Studies No results found for: T4, T3U, TSH, TSHEXT, TSHEXT           Shantell Ruiz MD

## 2017-12-29 NOTE — PROGRESS NOTES
Bedside shift change report given to Oliva RN (oncoming nurse) by Christel Steve (offgoing nurse). Report included the following information SBAR, Kardex and MAR.

## 2018-01-02 ENCOUNTER — TELEPHONE (OUTPATIENT)
Dept: OTHER | Age: 43
End: 2018-01-02

## 2018-01-02 NOTE — TELEPHONE ENCOUNTER
Called pt to follow up on recent hospitalization here at [place]. Patient was discharged home on Friday and states he is doing well. He got his Rx filled and is taking as directed. Pt has f/u appt with cardiology for device/wound check in am and then f/u with Dr. Francheska Szymanski his Pcp on the 4th.     Elsa Henriquez RN

## 2018-01-03 ENCOUNTER — OFFICE VISIT (OUTPATIENT)
Dept: CARDIOLOGY CLINIC | Age: 43
End: 2018-01-03

## 2018-01-03 DIAGNOSIS — Z95.810 AICD (AUTOMATIC CARDIOVERTER/DEFIBRILLATOR) PRESENT: ICD-10-CM

## 2018-01-03 DIAGNOSIS — I46.9 CARDIAC ARREST WITH PULSELESS ELECTRICAL ACTIVITY (HCC): Primary | ICD-10-CM

## 2018-01-03 NOTE — PATIENT INSTRUCTIONS
Pacemaker Placement: What to Expect at [address] [facility] placement is surgery to put a pacemaker in your chest. This surgery may be done if you have bradycardia (a slow heart rate). A pacemaker is a small, battery-powered device. It sends electrical signals to the heart. These signals work to keep the heartbeat steady. Thin wires, called leads, carry the signals from the pacemaker to the heart. A pacemaker can prevent or reduce dizziness, fainting, and shortness of breath caused by a slow or unsteady heartbeat. Your chest may be sore where the doctor made the cut (incision) and put in the pacemaker. You also may have a bruise and mild swelling. These symptoms usually get better in 1 to 2 weeks. You may feel a hard ridge along the incision. This usually gets softer in the months after surgery. You may be able to see or feel the outline of the pacemaker under your skin. You will probably be able to go back to work or your usual routine 1 to 2 weeks after surgery. Pacemaker batteries usually last 5 to 15 years. Your doctor will talk to you about how often you will need to have your pacemaker checked. You can safely use most household and office electronics. This includes things such as kitchen appliances, electric power tools, and computers. You will need to stay away from things with strong magnetic and electrical fields. This includes things such as an MRI machine (unless your pacemaker is safe for an MRI), welding equipment, and power generators. You can use a cell phone, but keep it at least 6 inches away from your pacemaker. Check with your doctor about what you need to stay away from, what you need to use with care, and what is okay to use. This care sheet gives you a general idea about how long it will take for you to recover. But each person recovers at a different pace. Follow the steps below to get better as quickly as possible. How can you care for yourself at home? Activity  ? · Rest when you feel tired. ? · Be active. Walking is a good choice. ? · For 4 to 6 weeks:  ¨ Avoid activities that strain your chest or upper arm muscles. This includes pushing a  or vacuum, or mopping floors. It also includes swimming, or swinging a golf club or tennis racquet. ¨ Do not raise your arm (the one on the side of your body where the pacemaker is located) above your shoulder. ¨ Allow your body to heal. Don't move quickly or lift anything heavy until you are feeling better. ? · Many people are able to return to work within 1 to 2 weeks after surgery. ? · Ask your doctor when it is okay for you to have sex. Diet  ? · You can eat your normal diet. If your stomach is upset, try bland, low-fat foods like plain rice, broiled chicken, toast, and yogurt. Medicines  ? · Your doctor will tell you if and when you can restart your medicines. He or she will also give you instructions about taking any new medicines. ? · If you take aspirin or some other blood thinner, be sure to talk to your doctor. He or she will tell you if and when to start taking this medicine again. Make sure that you understand exactly what your doctor wants you to do. ? · Be safe with medicines. Read and follow all instructions on the label. ¨ If the doctor gave you a prescription medicine for pain, take it as prescribed. ¨ If you are not taking a prescription pain medicine, ask your doctor if you can take an over-the-counter medicine. ¨ Do not take aspirin, ibuprofen (Advil, Motrin), naproxen (Aleve), or other nonsteroidal anti-inflammatory drugs (NSAIDs) unless your doctor says it is okay. ? · If your doctor prescribed antibiotics, take them as directed. Do not stop taking them just because you feel better. You need to take the full course of antibiotics. Incision care  ?  · If you have strips of tape on the incision, leave the tape on for a week or until it falls off.   ? · Keep the incision dry while it heals. Your doctor may recommend sponge baths for about 7 days, but do not get the incision wet. Your doctor will let you know when you may take showers. After a shower, pat the incision dry. ? · Don't use hydrogen peroxide or alcohol on the incision, which can slow healing. You may cover the area with a gauze bandage if it oozes fluid or rubs against clothing. Change the bandage every day. ? · Do not take a bath or get into a hot tub for the first 2 weeks, or until your doctor tells you it is okay. Other instructions  ? · Keep a medical ID card with you at all times that says you have a pacemaker. The card should include the  and model information. ? · Wear medical alert jewelry stating that you have a pacemaker. You can buy this at most [employer]. ? · Check your pulse as directed by your doctor. ? · Have your pacemaker checked as often as your doctor recommends. In some cases, this may be done over the phone or the Internet. Your doctor will give you instructions about how to do this. Follow-up care is a key part of your treatment and safety. Be sure to make and go to all appointments, and call your doctor if you are having problems. It's also a good idea to know your test results and keep a list of the medicines you take. When should you call for help? Call 911 anytime you think you may need emergency care. For example, call if:  ? · You passed out (lost consciousness). ? · You have trouble breathing. ?Call your doctor now or seek immediate medical care if:  ? · You are dizzy or light-headed, or you feel like you may faint. ? · You have pain that does not get better after you take pain medicine. ? · You hear an alarm or feel a vibration from your pacemaker. ? · You have loose stitches, or your incision comes open. ? · Bright red blood has soaked through the bandage over your incision.    ? · You have signs of infection, such as:  ¨ Increased pain, swelling, warmth, or redness. ¨ Red streaks leading from the incision. ¨ Pus draining from the incision. ¨ A fever. ? Watch closely for changes in your health, and be sure to contact your doctor if:  ? · You have any problems with your pacemaker. Where can you learn more? Go to http://rafia-dada.info/. Enter G550 in the search box to learn more about \"Pacemaker Placement: What to Expect at Home. \"  Current as of: September 21, 2016  Content Version: 11.4  © 5635-9812 [employer]. Care instructions adapted under license by [employer] (which disclaims liability or warranty for this information). If you have questions about a medical condition or this instruction, always ask your healthcare professional. [address] any warranty or liability for your use of this information.

## 2018-01-03 NOTE — MR AVS SNAPSHOT
Visit Information Date & Time Provider Department Dept. Phone Encounter #  
 1/3/2018  9:00 AM [facility] [phone] [id number] Your Appointments 1/3/2018  9:00 AM  
WOUND CHECK with [facility] ([address]) Appt Note: wound check Dr Vandana Pacheco dual chamber Medtronic MRI compatible AICD for primary prevention and atrial monitoring/pacing. 12/27/17. will follow up with PCP after; pt was a day early/added on  
 [place] [id number] [phone] [address] Upcoming Health Maintenance Date Due Pneumococcal 19-64 Medium Risk (1 of 1 - PPSV23) 10/16/1995 DTaP/Tdap/Td series (1 - Tdap) 10/16/1997 Influenza Age 5 to Adult 8/1/2017 Allergies as of 1/3/2018  Review Complete On: 12/29/2017 By: Jovany Powell RN No Known Allergies Current Immunizations  Never Reviewed No immunizations on file. Not reviewed this visit Vitals Smoking Status Never Smoker Your Updated Medication List  
  
   
This list is accurate as of: 1/3/18  8:50 AM.  Always use your most recent med list.  
  
  
  
  
 aspirin 81 mg chewable tablet Take 1 Tab by mouth daily. carvedilol 25 mg tablet Commonly known as:  Ruthell Fellers Take 1 Tab by mouth two (2) times daily (with meals). furosemide 40 mg tablet Commonly known as:  LASIX Take 1 Tab by mouth daily. lisinopril 20 mg tablet Commonly known as:  Loralie Lizarraga Take 1 Tab by mouth daily. spironolactone 25 mg tablet Commonly known as:  ALDACTONE Take 1 Tab by mouth daily. Patient Instructions Pacemaker Placement: What to Expect at [facility] Your Recovery Pacemaker placement is surgery to put a pacemaker in your chest. This surgery may be done if you have bradycardia (a slow heart rate). A pacemaker is a small, battery-powered device. It sends electrical signals to the heart. These signals work to keep the heartbeat steady. Thin wires, called leads, carry the signals from the pacemaker to the heart. A pacemaker can prevent or reduce dizziness, fainting, and shortness of breath caused by a slow or unsteady heartbeat. Your chest may be sore where the doctor made the cut (incision) and put in the pacemaker. You also may have a bruise and mild swelling. These symptoms usually get better in 1 to 2 weeks. You may feel a hard ridge along the incision. This usually gets softer in the months after surgery. You may be able to see or feel the outline of the pacemaker under your skin. You will probably be able to go back to work or your usual routine 1 to 2 weeks after surgery. Pacemaker batteries usually last 5 to 15 years. Your doctor will talk to you about how often you will need to have your pacemaker checked. You can safely use most household and office electronics. This includes things such as kitchen appliances, electric power tools, and computers. You will need to stay away from things with strong magnetic and electrical fields. This includes things such as an MRI machine (unless your pacemaker is safe for an MRI), welding equipment, and power generators. You can use a cell phone, but keep it at least 6 inches away from your pacemaker. Check with your doctor about what you need to stay away from, what you need to use with care, and what is okay to use. This care sheet gives you a general idea about how long it will take for you to recover. But each person recovers at a different pace. Follow the steps below to get better as quickly as possible. How can you care for yourself at home? Activity ? · Rest when you feel tired. ? · Be active. Walking is a good choice. ? · For 4 to 6 weeks: ¨ Avoid activities that strain your chest or upper arm muscles. This includes pushing a  or vacuum, or mopping floors. It also includes swimming, or swinging a golf club or tennis racquet. ¨ Do not raise your arm (the one on the side of your body where the pacemaker is located) above your shoulder. ¨ Allow your body to heal. Don't move quickly or lift anything heavy until you are feeling better. ? · Many people are able to return to work within 1 to 2 weeks after surgery. ? · Ask your doctor when it is okay for you to have sex. Diet ? · You can eat your normal diet. If your stomach is upset, try bland, low-fat foods like plain rice, broiled chicken, toast, and yogurt. Medicines ? · Your doctor will tell you if and when you can restart your medicines. He or she will also give you instructions about taking any new medicines. ? · If you take aspirin or some other blood thinner, be sure to talk to your doctor. He or she will tell you if and when to start taking this medicine again. Make sure that you understand exactly what your doctor wants you to do. ? · Be safe with medicines. Read and follow all instructions on the label. ¨ If the doctor gave you a prescription medicine for pain, take it as prescribed. ¨ If you are not taking a prescription pain medicine, ask your doctor if you can take an over-the-counter medicine. ¨ Do not take aspirin, ibuprofen (Advil, Motrin), naproxen (Aleve), or other nonsteroidal anti-inflammatory drugs (NSAIDs) unless your doctor says it is okay. ? · If your doctor prescribed antibiotics, take them as directed. Do not stop taking them just because you feel better. You need to take the full course of antibiotics. Incision care ? · If you have strips of tape on the incision, leave the tape on for a week or until it falls off.  
? · Keep the incision dry while it heals.  Your doctor may recommend sponge baths for about 7 days, but do not get the incision wet. Your doctor will let you know when you may take showers. After a shower, pat the incision dry. ? · Don't use hydrogen peroxide or alcohol on the incision, which can slow healing. You may cover the area with a gauze bandage if it oozes fluid or rubs against clothing. Change the bandage every day. ? · Do not take a bath or get into a hot tub for the first 2 weeks, or until your doctor tells you it is okay. Other instructions ? · Keep a medical ID card with you at all times that says you have a pacemaker. The card should include the  and model information. ? · Wear medical alert jewelry stating that you have a pacemaker. You can buy this at most [employer]. ? · Check your pulse as directed by your doctor. ? · Have your pacemaker checked as often as your doctor recommends. In some cases, this may be done over the phone or the Internet. Your doctor will give you instructions about how to do this. Follow-up care is a key part of your treatment and safety. Be sure to make and go to all appointments, and call your doctor if you are having problems. It's also a good idea to know your test results and keep a list of the medicines you take. When should you call for help? Call 911 anytime you think you may need emergency care. For example, call if: 
? · You passed out (lost consciousness). ? · You have trouble breathing. ?Call your doctor now or seek immediate medical care if: 
? · You are dizzy or light-headed, or you feel like you may faint. ? · You have pain that does not get better after you take pain medicine. ? · You hear an alarm or feel a vibration from your pacemaker. ? · You have loose stitches, or your incision comes open. ? · Bright red blood has soaked through the bandage over your incision. ? · You have signs of infection, such as: 
¨ Increased pain, swelling, warmth, or redness. ¨ Red streaks leading from the incision. ¨ Pus draining from the incision. ¨ A fever. ? Watch closely for changes in your health, and be sure to contact your doctor if: 
? · You have any problems with your pacemaker. Where can you learn more? Go to http://rafia-dada.info/. Enter G550 in the search box to learn more about \"Pacemaker Placement: What to Expect at Home. \" Current as of: September 21, 2016 Content Version: 11.4 © 6165-1753 [employer]. Care instructions adapted under license by [employer] (which disclaims liability or warranty for this information). If you have questions about a medical condition or this instruction, always ask your healthcare professional. [address] any warranty or liability for your use of this information. Introducing [facility] [place] [facility]! [facility] introduces [employer] patient portal. Now you can access parts of your medical record, email your doctor's office, and request medication refills online. 1. In your internet browser, go to [email or web address] 2. Click on the First Time User? Click Here link in the Sign In box. You will see the New Member Sign Up page. 3. Enter your [employer] Access Code exactly as it appears below. You will not need to use this code after youve completed the sign-up process. If you do not sign up before the expiration date, you must request a new code. · [employer] Access Code: [email or web address] Expires: 3/29/2018 10:04 AM 
 
4. Enter the last four digits of your Social Security Number (xxxx) and Date of Birth (mm/dd/yyyy) as indicated and click Submit. You will be taken to the next sign-up page. 5. Create a [employer] ID. This will be your [employer] login ID and cannot be changed, so think of one that is secure and easy to remember. 6. Create a [employer] Chantilly Lacet password. You can change your password at any time. 7. Enter your Password Reset Question and Answer. This can be used at a later time if you forget your password. 8. Enter your e-mail address. You will receive e-mail notification when new information is available in [address] 9. Click Sign Up. You can now view and download portions of your medical record. 10. Click the Download Summary menu link to download a portable copy of your medical information. If you have questions, please visit the Frequently Asked Questions section of the [employer] website. Remember, [employer] is NOT to be used for urgent needs. For medical emergencies, dial 911. Now available from your iPhone and Android! Please provide this summary of care documentation to your next provider. Your primary care clinician is listed as NONE. If you have any questions after today's visit, please call [phone].

## 2018-01-25 NOTE — DISCHARGE SUMMARY
[address] [facility] [address] Amber Mc  MR#: [id number]  : 1975  ACCOUNT #: [de-identified]   ADMIT DATE: 2017  DISCHARGE DATE: 2017    HOSPITAL COURSE:  The patient was admitted for outpatient lateral ankle reconstruction. During his surgery, he had a cardiac arrest which was short with no evidence of hypoxia. He was returned to spontaneous circulation in 5 minutes or less after 3 doses of epinephrine and was transferred to the intensive care unit. His surgery was aborted and closure of the skin wound was all that was performed. His cardiac consultation noted cardiomyopathy, nonischemic, and congestive heart failure. His course in the ICU is to be separately dictated by the intensivist.  At the time of discharge, he had a pacemaker. He was ambulatory, weightbearing as tolerated in a boot and taking Percocet for pain management.   He also had multiple cardiotropic drugs, which will be dictated by the intensivist.    DISPOSITION: he was to followup with cardiology and me in 10 days      Malik LAURENT  D: 2018 14:59     T: 2018 15:32  JOB #: [id number]

## 2018-03-25 NOTE — PROGRESS NOTES
NUTRITION    Nutrition Screen      RECOMMENDATIONS / PLAN:     - Continue tube feeding of Vital High Protein at 45 mL/hr with 100 mL q 4 hour water flushes and Prosource 4 times daily. Increase to goal rate of 60 mL/hr once propofol is stopped. - Continue RD inpatient monitoring and evaluation. Goal Regimen: Vital High Protein at 60 mL/hr + 100 mL q 4 hour water flushes to provide: 1440 kcal, 126 gm protein, 161 gm CHO, 0 gm fiber, 1204 mL free water, 1804 mL total water, 100% RDIs  Tube Feeding + Prosource 4 times daily to provide: 1680 kcal, 186 gm protein daily     NUTRITION INTERVENTIONS & DIAGNOSIS:     [x] Enteral nutrition support: continue   [x] Coordination of nutrition care: interdisciplinary rounds    Nutrition Diagnosis: Inadequate oral intake related to inability to tolerate po due to respiratory status as evidenced by pt NPO while on the vent. ASSESSMENT:     12/22: Pt tolerating tube feeding at 45 mL/hr and remains sedated on propofol, sedation holiday this morning. 12/21: Pt intubated s/p cardiac arrest during left ankle reconstructive surgery on 12/20. OG tube to suction, plan to start feeds per MD.     EN infusion adequate to meet patients estimated nutritional needs:   [] Yes     [x] No (energy needs met with feedings and propofol sedation)   [] Unable to determine at this time    Tube Feeding: Vital High Protein at 45 mL/hr via OGT   Water Flushes: 100 mL q 4 hours  Residuals: 0 mL     Diet: DIET TUBE FEEDING Please increase to goal rate of 60 mL/hr once propofol is stopped. DIET NUTRITIONAL SUPPLEMENTS Breakfast, Lunch, Dinner, HS Snack; PROSOURCE NPO     Food Allergies: NKFA  Current Appetite:   [] Good     [] Fair     [] Poor     [x] Other: NPO  Appetite/meal intake prior to admission:   [] Good     [] Fair     [] Poor     [x] Other: unknown   Feeding Limitations:  [] Swallowing difficulty    [] Chewing difficulty    [] Other:  Current Meal Intake: No data found.     BM: 12/20  Skin Integrity: ankle wound  Edema: none   Pertinent Medications: Reviewed: propofol at 30 mcg/kg/min (729 kcal per day)    Recent Labs      12/22/17   0400  12/21/17   0220  12/20/17   1150   NA  137  139  138   K  4.0  3.7  5.6*   CL  101  105  105   CO2  30  27  29   GLU  98  87  209*   BUN  12  14  20*   CREA  1.23  0.97  1.53*   CA  8.0*  7.9*  8.0*   MG   --    --   2.0       Intake/Output Summary (Last 24 hours) at 12/22/17 1025  Last data filed at 12/22/17 0900   Gross per 24 hour   Intake          2645.03 ml   Output             2010 ml   Net           635.03 ml       Anthropometrics:  Ht Readings from Last 1 Encounters:   12/20/17 5' 9\" (1.753 m)     Last 3 Recorded Weights in this Encounter    12/20/17 0628 12/21/17 0639 12/22/17 0502   Weight: 153.3 kg (338 lb) 156.2 kg (344 lb 5.7 oz) (!) 160.2 kg (353 lb 2.8 oz)     Body mass index is 52.16 kg/(m^2). Obese, Class III     Weight History:   Weight Metrics 12/22/2017 12/20/2017   Weight 353 lb 2.8 oz -   BMI - 52.16 kg/m2        Admitting Diagnosis: S93.402D ANKLE SPRAIN  Ankle instability, left  Bradycardia following surgery  Pertinent PMHx: HTN, CAD    Education Needs:        [x] None identified  [] Identified - Not appropriate at this time  []  Identified and addressed - refer to education log  Learning Limitations:   [] None identified  [x] Identified: intubated     Cultural, Quaker & ethnic food preferences:  [x] None identified    [] Identified and addressed     ESTIMATED NUTRITION NEEDS:     Calories: 2762-9241 kcal (22-25 kcal/kg) based on  [] Actual BW      [x] IBW 73 kg  Protein: 182-219 gm (2.5-3 gm/kg) based on  [] Actual BW      [x] IBW   Fluid: 1 mL/kcal     MONITORING & EVALUATION:     Nutrition Goal(s):   1. Nutritional needs will be met through adequate oral intake or nutrition support within the next 7 days.   Outcome:  [] Met/Ongoing    []  Not Met    [x] New/Initial Goal     Monitoring:   [] Food and beverage intake   [] Diet order [x] Nutrition-focused physical findings   [x] Treatment/therapy   [] Weight   [x] Enteral nutrition intake        Previous Recommendations (for follow-up assessments only):     []   Implemented       []   Not Implemented (RD to address)      [] No Longer Appropriate     [] No Recommendation Made     Discharge Planning: pending ability to tolerate po and plan of care  [x] Participated in care planning, discharge planning, & interdisciplinary rounds as appropriate      oFzia Diana, [address] [place] [address], [address] [place] [address]    Pager: [phone] No

## 2019-05-10 ENCOUNTER — HOSPITAL ENCOUNTER (OUTPATIENT)
Dept: CT IMAGING | Age: 44
Discharge: HOME | End: 2019-05-10
Attending: ORTHOPAEDIC SURGERY
Payer: OTHER GOVERNMENT

## 2019-05-10 DIAGNOSIS — S93.402D SPRAIN OF UNSPECIFIED LIGAMENT OF LEFT ANKLE, SUBSEQUENT ENCOUNTER: ICD-10-CM

## 2019-05-10 PROCEDURE — 73700 CT LOWER EXTREMITY W/O DYE: CPT

## 2019-09-04 ENCOUNTER — HOSPITAL ENCOUNTER (OUTPATIENT)
Dept: CT IMAGING | Age: 44
Discharge: HOME | End: 2019-09-04
Attending: ORTHOPAEDIC SURGERY
Payer: OTHER GOVERNMENT

## 2019-09-04 DIAGNOSIS — S93.402D SPRAIN OF UNSPECIFIED LIGAMENT OF LEFT ANKLE, SUBSEQUENT ENCOUNTER: ICD-10-CM

## 2019-09-04 PROCEDURE — 73700 CT LOWER EXTREMITY W/O DYE: CPT

## 2020-01-09 ENCOUNTER — HOSPITAL ENCOUNTER (OUTPATIENT)
Dept: CT IMAGING | Age: 45
Discharge: HOME | End: 2020-01-09
Attending: ORTHOPAEDIC SURGERY
Payer: OTHER GOVERNMENT

## 2020-01-09 DIAGNOSIS — S93.402D SPRAIN OF UNSPECIFIED LIGAMENT OF LEFT ANKLE, SUBSEQUENT ENCOUNTER: ICD-10-CM

## 2020-01-09 PROCEDURE — 73700 CT LOWER EXTREMITY W/O DYE: CPT

## (undated) DEVICE — WRAP COMPR W4INXL5YD NONSTERILE TAN SELF ADH COBAN

## (undated) DEVICE — SUTURE ETHLN SZ 4-0 L18IN NONABSORBABLE BLK L19MM PC-5 3/8 1894G

## (undated) DEVICE — PADDING CAST W4INXL4YD ST COT COHESIVE HND TEARABLE SPEC

## (undated) DEVICE — BANDAGE COMPR 9 FTX4 IN SMOOTH COMFORTABLE SYNTH ESMRK LF

## (undated) DEVICE — KIT CLN UP BON SECOURS MARYV

## (undated) DEVICE — INTENDED FOR TISSUE SEPARATION, AND OTHER PROCEDURES THAT REQUIRE A SHARP SURGICAL BLADE TO PUNCTURE OR CUT.: Brand: BARD-PARKER SAFETY BLADES SIZE 15, STERILE

## (undated) DEVICE — KERLIX BANDAGE ROLL: Brand: KERLIX

## (undated) DEVICE — (D)PREP SKN CHLRAPRP APPL 26ML -- CONVERT TO ITEM 371833

## (undated) DEVICE — SUTURE VCRL SZ 3-0 L27IN ABSRB UD L26MM SH 1/2 CIR J416H

## (undated) DEVICE — GRAFT TISS TEND GRCILS ASEP --: Type: IMPLANTABLE DEVICE | Site: ANKLE | Status: NON-FUNCTIONAL

## (undated) DEVICE — FLEX ADVANTAGE 3000CC: Brand: FLEX ADVANTAGE

## (undated) DEVICE — OCCLUSIVE GAUZE STRIP,3% BISMUTH TRIBROMOPHENATE IN PETROLATUM BLEND: Brand: XEROFORM

## (undated) DEVICE — 3M™ BAIR PAWS FLEX™ WARMING GOWN, STANDARD, 20 PER CASE 81003: Brand: BAIR PAWS™

## (undated) DEVICE — SLIM BODY SKIN STAPLER: Brand: APPOSE ULC

## (undated) DEVICE — SUT ETHLN 3-0 18IN PC5 BLK --

## (undated) DEVICE — (D)GLOVE SURG TRIFLX 8 PWD LTX -- DISC BY MFR USE ITEM 302994

## (undated) DEVICE — SOLUTION SCRB 4OZ 4% CHG CLN BASE FOR PT SKIN ANTISEPSIS

## (undated) DEVICE — INTENDED FOR TISSUE SEPARATION, AND OTHER PROCEDURES THAT REQUIRE A SHARP SURGICAL BLADE TO PUNCTURE OR CUT.: Brand: BARD-PARKER SAFETY BLADES SIZE 10, STERILE

## (undated) DEVICE — PACK SURG BSHR TOT KNEE LF

## (undated) DEVICE — SOLUTION IV 1000ML 0.9% SOD CHL

## (undated) DEVICE — GAUZE SPONGES,16 PLY: Brand: CURITY

## (undated) DEVICE — Z DISCONTINUED BY MEDLINE USE 2711682 TRAY SKIN PREP DRY W/ PREM GLV

## (undated) DEVICE — STERILE LATEX POWDER-FREE SURGICAL GLOVESWITH NITRILE COATING: Brand: PROTEXIS

## (undated) DEVICE — SOFT SILICONE HYDROCELLULAR SACRUM DRESSING WITH LOCK AWAY LAYER: Brand: ALLEVYN LIFE SACRUM (LARGE) PACK OF 10

## (undated) DEVICE — SUTURE VCRL SZ 3-0 L18IN ABSRB VLT L26MM SH 1/2 CIR J774D

## (undated) DEVICE — KENDALL SCD EXPRESS SLEEVES, KNEE LENGTH, MEDIUM: Brand: KENDALL SCD

## (undated) DEVICE — NEEDLE NRV BLK 21GA L4IN 30DEG INSUL BVL EXTN SET STIMUPLEX